# Patient Record
Sex: FEMALE | Race: BLACK OR AFRICAN AMERICAN
[De-identification: names, ages, dates, MRNs, and addresses within clinical notes are randomized per-mention and may not be internally consistent; named-entity substitution may affect disease eponyms.]

---

## 2018-07-11 ENCOUNTER — HOSPITAL ENCOUNTER (OUTPATIENT)
Dept: HOSPITAL 35 - PAIN | Age: 78
Discharge: HOME | End: 2018-07-11
Attending: ANESTHESIOLOGY
Payer: COMMERCIAL

## 2018-07-11 VITALS — HEIGHT: 62.99 IN | WEIGHT: 293 LBS | BODY MASS INDEX: 51.91 KG/M2

## 2018-07-11 VITALS — SYSTOLIC BLOOD PRESSURE: 149 MMHG | DIASTOLIC BLOOD PRESSURE: 103 MMHG

## 2018-07-11 DIAGNOSIS — M47.22: Primary | ICD-10-CM

## 2018-07-11 DIAGNOSIS — M17.0: ICD-10-CM

## 2018-07-11 DIAGNOSIS — Z79.899: ICD-10-CM

## 2018-07-11 DIAGNOSIS — M16.0: ICD-10-CM

## 2018-07-11 DIAGNOSIS — M10.9: ICD-10-CM

## 2018-07-11 DIAGNOSIS — E66.09: ICD-10-CM

## 2018-07-11 DIAGNOSIS — I10: ICD-10-CM

## 2018-07-11 DIAGNOSIS — Z98.890: ICD-10-CM

## 2018-07-11 DIAGNOSIS — Z90.710: ICD-10-CM

## 2018-07-11 DIAGNOSIS — F11.20: ICD-10-CM

## 2018-07-11 DIAGNOSIS — Z87.19: ICD-10-CM

## 2019-11-06 ENCOUNTER — HOSPITAL ENCOUNTER (OUTPATIENT)
Dept: HOSPITAL 35 - PAIN | Age: 79
Discharge: HOME | End: 2019-11-06
Attending: ANESTHESIOLOGY
Payer: COMMERCIAL

## 2019-11-06 VITALS — SYSTOLIC BLOOD PRESSURE: 156 MMHG | DIASTOLIC BLOOD PRESSURE: 99 MMHG

## 2019-11-06 DIAGNOSIS — M54.2: Primary | ICD-10-CM

## 2019-11-06 DIAGNOSIS — M47.22: ICD-10-CM

## 2019-11-06 DIAGNOSIS — Z79.891: ICD-10-CM

## 2019-11-06 DIAGNOSIS — G89.29: ICD-10-CM

## 2019-11-06 DIAGNOSIS — I10: ICD-10-CM

## 2019-11-06 DIAGNOSIS — Z79.899: ICD-10-CM

## 2019-11-06 DIAGNOSIS — Z98.890: ICD-10-CM

## 2019-11-06 DIAGNOSIS — E66.01: ICD-10-CM

## 2019-11-06 NOTE — NUR
Pain Clinic Assessment:
 
1. History of Osteoarthritis:
NECK
KNEES
   History of Rheumatoid Arthritis:
Not Applicable
 
2. Height: 5 ft. 3 in. 160.0 cm.
   Weight:  lb.  oz.  kg.
   Patient's BMI:
 
3. Vital Signs:
   BP: 156/99 Pulse: 92 Resp: 18
   Temp:  02 Sat: 97 ECG Mon:
 
4. Pain Intensity: 7
 
5. Fall Risk:
   Dizziness: N  Needs help standing or walking: Y
   Fallen in the last 3 months: N
   Fall risk comments:
 
 
6. Patient on Blood Thinner: None
 
7. History of Hypertension: Y
 
8. Opioid Therapy greater than 6 weeks: N
   Opiate Contract Signed:
 
9. Risk Assessment Tool Provided: LOW RISK 0/3
 
10. Functional Assessment Tool: 41/70
 
11. Recreational Drug Use: Never Drug Type:
    Tobacco Use: Never Smoker Tobacco Type:
       Amount or Packs/day:  How Many Years:
    Alcohol Use: No  Frequency:  Quant:

## 2019-11-12 NOTE — HPC
[facility]
[address] [place]   [address]                     PAIN MANAGEMENT CONSULTATION  
_______________________________________________________________________________
 
Name:       CHRISTIANA KEYS                 Room #:                     REG [facility]#:      [id number]                       Account #:      [id number]  
Admission:  11/06/19    Attend Phys:    Johnathon Jeffries DO  
Discharge:              Date of Birth:  [birth year]  
                                                          Report #: [id number]
                                                                    [id number]   
_______________________________________________________________________________
THIS REPORT FOR:   //name//                          
 
CC: Sanam Irwin MD
 
DATE OF SERVICE:  11/06/2019
 
 
REFERRING PHYSICIAN:  Sanam Pacheco MD
 
CHIEF COMPLAINT:  Neck pain.
 
HISTORY OF PRESENT ILLNESS:  As you know, the patient is a morbidly obese
78-year-old female who returns today in followup visit to undergo next in the
series of cervical epidural injections under fluoroscopic guidance.  The patient
states she received excellent benefit with previous cervical epidural injection
reporting 95% improvement in overall pain, lasting for nearly 6 months. 
Unfortunately, her symptoms have begun to return without inciting injury or
trauma.  She returns today in followup visit to undergo next in the series of
cervical epidural injections to address cervical radicular symptoms.
 
ALLERGIES:  No reported drug allergies.
 
CURRENT MEDICATIONS:  Valsartan/hydrochlorothiazide, dicyclomine, metoprolol,
allopurinol, oxybutynin, guaifenesin, sertraline, Alphagan, Trusopt,
Bimatoprost, hydrocodone, allopurinol, meloxicam, topiramate and omeprazole.
 
SOCIAL HISTORY:  The patient denies tobacco, alcohol, IV or illicit drug use. 
She is retired since 1996.  Accompanied by a friend who is present in room
today.
 
IMAGING:  No new imaging available.
 
PQRS:  The patient has known arthritic changes of the bilateral knees, bilateral
hips and lumbar spine.  No rheumatoid arthritis, placing pain intensity today
7/10.  She is a fall risk, but has not had a fall in the last 3 months.  She is
utilizing a wheelchair for mobilization.  She is not on blood thinners, but is
treated for hypertension.  She is on chronic opioids, has a low opioid addiction
potential.  Pain impact score is 41/70, indicating moderate-to-severe
interference of daily activities secondary to pain.
 
PHYSICAL EXAMINATION:
VITAL SIGNS:  Blood pressure 156/99, pulse 92, respiratory rate 18 and
unlabored.  The patient is 97% on room air, current pain score is 7/10.
 
 
 
[address]   [address]                     PAIN MANAGEMENT CONSULTATION  
_______________________________________________________________________________
 
Name:       CHRISTIANA KEYS                 Room #:                     REG [facility]#:      [id number]                       Account #:      [id number]  
Admission:  11/06/19    Attend Phys:    Johnathon Jeffries DO  
Discharge:              Date of Birth:  [birth year]  
                                                          Report #: [id number]
                                                                    [id number]   
_______________________________________________________________________________
HEENT:  Normocephalic, atraumatic.  Pupils equal, round, reactive to light. 
Extraocular muscles are intact.  Sclerae nonicteric without injection.
NEUROLOGIC:  Speech fluent.  The patient deemed a fair historian.
EXTREMITIES:  Show no clubbing, no cyanosis, and no edema.
MUSCULOSKELETAL:  Upper extremity strength is symmetrical 5/5.  Deconditioning
noted bilaterally.  She is intact to light touch from C5 through T1 dermatomes. 
Spurling's test is equivocal.  Cervical provocation testing is met with
increasing axial cervical spine pain, no radiation of symptoms.
 
ASSESSMENT:
1.  Symptomatic cervical radiculopathy.
2.  Cervical spondylosis with radiculopathy.
3.  Severe morbid obesity.
4.  Uncontrolled hypertension.
5.  Chronic intractable pain.
 
PLAN:
1.  The patient returns today in followup visit indicating a pain level of 7/10.
 She received excellent benefit with previous cervical epidural injection
reporting 95% improvement in overall pain lasting for 6 months.  Unfortunately,
her symptoms have begun to return.  She denies injury or trauma, may have led to
symptom reoccurrence.  She returns to undergo next in the series of cervical
epidural injections under fluoroscopic guidance.
 
The patient has been advised risks and benefits of a cervical epidural
injection.  These risks include but are not necessarily limited to bleeding,
bruising, infection, worsening of pain, no relief of pain, temporary or
permanent muscle weakness, temporary or permanent nerve damage, possible
paralysis, post-dural puncture headache and death.  The patient states
understood and wished to proceed.
2.  No medication changes made at today's visit.  The patient will continue
current medical therapy as prescribed.
3.  We will see the patient back in followup visit on an as needed basis for
possible next in a series of cervical epidural injections.
 
PROCEDURE NOTE
 
DESCRIPTION OF PROCEDURE:  C7-T1 cervical epidural steroid injection under
fluoroscopic guidance.
 
This is the second procedure of the first series that the patient is undergoing.
 
After obtaining written consent, the patient was taken back to the fluoroscopy
suite and placed in a prone position with separate pillows under chest and
forehead to decrease cervical lordosis.  The skin overlying the cervical area
was prepped and draped in an aseptic fashion.  The C7-T1 vertebral interspace
 
 
 
[address]   [address]                     PAIN MANAGEMENT CONSULTATION  
_______________________________________________________________________________
 
Name:       CHRISTIANA KEYS                 Room #:                     REG [facility]#:      [id number]                       Account #:      [id number]  
Admission:  11/06/19    Attend Phys:    Johnathon Jeffries DO  
Discharge:              Date of Birth:  [birth year]  
                                                          Report #: [id number]
                                                                    [id number]   
_______________________________________________________________________________
was identified by AP fluoroscopy.  The skin and subcutaneous tissue overlying
the target site of injection was anesthetized using 3 mL of 1% lidocaine.
 
A 20-gauge 3-1/2 inch Tuohy needle was advanced under fluoroscopic guidance
toward the epidural space using a midline approach.  The epidural space was
identified using a loss of resistance to air technique.  After negative
aspiration for heme or cerebrospinal fluid, a total of 1 mL of Omnipaque was
injected.  A cervical epidurogram was confirmed using AP and oblique
fluoroscopy.  After negative aspiration for heme or cerebrospinal fluid, 5 mL of
solution containing 2 mL 40 mg per mL, 80 mg total triamcinolone along with 3 mL
lidocaine 1% was injected in increments.  Contrast spread was noted from
posterior epidural space.  The needle was then retracted approximately senior care
and the needle track was flushed with 1 mL of 1% lidocaine.  There were no
apparent new sensory deficits in the upper extremities present following the
procedure.  A sterile bandage was placed over the injection site.
 
The heart rate, pulse oximetry and blood pressure were continuously monitored
after the procedure.  There were no apparent complications.  The patient
tolerated the procedure well and was carefully escorted in the recovery room in
stable condition.  After meeting discharge criteria, the patient was discharged
home.
 
 
 
 
 
 
 
 
 
 
 
 
 
 
 
 
 
 
 
 
 
 
 
  <ELECTRONICALLY SIGNED>
   By: Johnathon Jeffries DO          
  11/12/19     1305
D: 11/08/19 0821                           _____________________________________
T: 11/08/19 1017                           Johnathon Jeffries DO            /nt

## 2020-02-05 ENCOUNTER — HOSPITAL ENCOUNTER (OUTPATIENT)
Dept: HOSPITAL 35 - PAIN | Age: 80
Discharge: HOME | End: 2020-02-05
Attending: ANESTHESIOLOGY
Payer: COMMERCIAL

## 2020-02-05 VITALS — WEIGHT: 287 LBS | HEIGHT: 62.99 IN | BODY MASS INDEX: 50.85 KG/M2

## 2020-02-05 VITALS — DIASTOLIC BLOOD PRESSURE: 89 MMHG | SYSTOLIC BLOOD PRESSURE: 125 MMHG

## 2020-02-05 DIAGNOSIS — E66.01: ICD-10-CM

## 2020-02-05 DIAGNOSIS — M47.22: Primary | ICD-10-CM

## 2020-02-05 DIAGNOSIS — Z98.890: ICD-10-CM

## 2020-02-05 DIAGNOSIS — Z79.899: ICD-10-CM

## 2020-02-05 DIAGNOSIS — M19.90: ICD-10-CM

## 2020-02-05 DIAGNOSIS — G89.29: ICD-10-CM

## 2020-02-05 DIAGNOSIS — I10: ICD-10-CM

## 2020-02-05 NOTE — NUR
Pain Clinic Assessment:
 
1. History of Osteoarthritis:
NECK
KNEES
   History of Rheumatoid Arthritis:
Not Applicable
 
2. Height: 5 ft. 3 in. 160.0 cm.
   Weight: 287.0 lb.  oz. 130.183 kg.
   Patient's BMI: 50.9
 
3. Vital Signs:
   BP: 125/89 Pulse: 88 Resp: 16
   Temp:  02 Sat: 95 ECG Mon:
 
4. Pain Intensity: 9
 
5. Fall Risk:
   Dizziness: N  Needs help standing or walking: Y
   Fallen in the last 3 months: N
   Fall risk comments:
 
 
6. Patient on Blood Thinner: None
 
7. History of Hypertension: Y
 
8. Opioid Therapy greater than 6 weeks: N
   Opiate Contract Signed:
 
9. Risk Assessment Tool Provided: LOW RISK 0/3
 
10. Functional Assessment Tool: 41/70
 
11. Recreational Drug Use: Never Drug Type:
    Tobacco Use: Never Smoker Tobacco Type:
       Amount or Packs/day:  How Many Years:
    Alcohol Use: No  Frequency:  Quant:

## 2020-05-19 ENCOUNTER — HOSPITAL ENCOUNTER (OUTPATIENT)
Dept: HOSPITAL 35 - PAIN | Age: 80
Discharge: HOME | End: 2020-05-19
Attending: ANESTHESIOLOGY
Payer: COMMERCIAL

## 2020-05-19 VITALS — WEIGHT: 293 LBS | BODY MASS INDEX: 51.91 KG/M2 | HEIGHT: 62.99 IN

## 2020-05-19 VITALS — SYSTOLIC BLOOD PRESSURE: 168 MMHG | DIASTOLIC BLOOD PRESSURE: 78 MMHG

## 2020-05-19 DIAGNOSIS — G89.29: ICD-10-CM

## 2020-05-19 DIAGNOSIS — E66.01: ICD-10-CM

## 2020-05-19 DIAGNOSIS — Z79.899: ICD-10-CM

## 2020-05-19 DIAGNOSIS — Z98.890: ICD-10-CM

## 2020-05-19 DIAGNOSIS — M19.90: ICD-10-CM

## 2020-05-19 DIAGNOSIS — I10: ICD-10-CM

## 2020-05-19 DIAGNOSIS — M47.22: Primary | ICD-10-CM

## 2020-05-19 NOTE — NUR
Pain Clinic Assessment:
 
1. History of Osteoarthritis:
NECK
KNEES
   History of Rheumatoid Arthritis:
Not Applicable
 
2. Height: 5 ft. 3 in. 160.0 cm.
   Weight: 302.4 lb.  oz. 137.168 kg.
   Patient's BMI: 53.6
 
3. Vital Signs:
   BP: 168/78 Pulse: 90 Resp: 20
   Temp:  02 Sat: 97 ECG Mon:
 
4. Pain Intensity: 6
 
5. Fall Risk:
   Dizziness: N  Needs help standing or walking: Y
   Fallen in the last 3 months: N
   Fall risk comments:
 
 
6. Patient on Blood Thinner: None
 
7. History of Hypertension: Y
 
8. Opioid Therapy greater than 6 weeks: N
   Opiate Contract Signed:
 
9. Risk Assessment Tool Provided: LOW RISK 0/2
 
10. Functional Assessment Tool: 22/70
 
11. Recreational Drug Use: Never Drug Type:
    Tobacco Use: Never Smoker Tobacco Type:
       Amount or Packs/day:  How Many Years:
    Alcohol Use: No  Frequency:  Quant:

## 2020-05-20 NOTE — HPC
[facility]
[place]   [address]                     PAIN MANAGEMENT CONSULTATION  
_______________________________________________________________________________
 
Name:       CHRISTIANA KEYS                 Room #:                     REG [facility]#:      [id number]                       Account #:      [id number]  
Admission:  05/19/20    Attend Phys:    Johnathon Jeffries DO  
Discharge:              Date of Birth:  [birth year]  
                                                          Report #: [id number]
                                                                    [id number]   
_______________________________________________________________________________
THIS REPORT FOR:  
 
cc:  Rell Olson MD, Steven A. MD Johnson, James E. DO                                          ~
 
DATE OF SERVICE:  05/19/2020
 
 
REFERRING PHYSICIAN:  Sanam Pacheco DO
 
CHIEF COMPLAINT:  Neck pain.
 
HISTORY OF PRESENT ILLNESS:  As you know, the patient is a morbidly obese
79-year-old female who returns today in followup visit with continued neck pain.
 The patient has an unusual stance.  She uses a roller walker and bends at
approximately 90-degree angle at the lumbar spine.  This leads to the necessity
of hyperextending her cervical spine to be able to see forward.  This is leading
to ongoing pain issues.  She returns today in followup visit stating no new
injury or trauma, but recurrence of her chronic cervicalgia and cervical
radicular symptoms.  She returns requesting an epidural injection under
fluoroscopic guidance.  She reports with the previous epidural injection, 75%
improvement for 2-1/2 months.
 
ALLERGIES:  No reported drug allergies.
 
CURRENT MEDICATIONS:  See chart.
 
SOCIAL HISTORY:  The patient denies tobacco, alcohol, IV or illicit drug use. 
She retired in 1996.  She is unaccompanied today.
 
IMAGING:  No new imaging available.
 
PQRS:  The patient has known arthritic changes of the bilateral knees, bilateral
hips, lumbar spine and cervical spine.  No rheumatoid arthritis is reported. 
She is a fall risk, but has not had a fall in last 3 months.  She is utilizing a
roller walker for ambulation.  She is not on blood thinners, but is treated for
hypertension.  She is not on chronic opioids, but does have a low opioid
addiction potential.  Pain impact score is 22/70 indicating mild-to-moderate
interference of daily activities secondary to pain.
 
PHYSICAL EXAMINATION:
VITAL SIGNS:  Blood pressure 168/78, pulse is 90, respiratory rate 20 and
unlabored.  The patient is 97% on room air.  Height 5 feet 3 inches tall, weight
302.4 pounds, BMI calculated at 53.6.
GENERAL:  Well-developed, well-nourished, well-hydrated, severely morbidly obese
 
 
 
[place] [address]                     PAIN MANAGEMENT CONSULTATION  
_______________________________________________________________________________
 
Name:       CHRISTIANA KEYS                 Room #:                     REG [facility] 
[facility]#:      [id number]                       Account #:      [id number]  
Admission:  05/19/20    Attend Phys:    Johnathon Jeffries DO  
Discharge:              Date of Birth:  [birth year]  
                                                          Report #: [id number]
                                                                    [id number]   
_______________________________________________________________________________
79-year-old female appearing stated age.  She is placing current pain score at
6/10.
HEENT:  Normocephalic, atraumatic.  Pupils are equal, round, and reactive.
EXTREMITIES:  Show no clubbing, no cyanosis.  There is 1+ nonpitting lower
extremity edema again noted today.
MUSCULOSKELETAL:  Upper extremity strength is symmetrical 5/5.  Deconditioning
noted bilaterally.  Muscle bulk and tone equal and symmetrical in upper
extremities, intact to light touch from C5-T1 dermatomes.  Spurling's test
remains equivocal.  There is noted pain with all motions of the cervical
provocation including extension, rotation and extension.
 
ASSESSMENT:
1.  Cervical radiculopathy.
2.  Cervical spondylosis with radicular symptoms.
3.  Class 3 severe morbid obesity.
4.  Chronic intractable pain.
 
PLAN:
1.  The patient returns today in followup visit where we have discussed ongoing
neck pain.  The patient reports recurrence of symptoms began about 2 weeks ago
and has progressively worsened.  I do feel that her stance is a contributor to
the patient's ongoing neck pain.  She stands in a forward flexed position of the
lumbar spine with a 90-degree angle.  This then causes hyperextension of the
cervical spine to be able to keep her eyes level on horizon in front of her. 
This is leading to exacerbation of her chronic neck symptoms.  We have discussed
having the patient undergo physical therapy and strengthening of the core
muscles of the back, allowing her to stand more erect.  She has been resistant
to this in the past.  We recommend that she consider this as an option.  She
will consider this discussion today and contact us if she wishes to move forward
with more strengthening options.
2.  The patient has consented to undergo cervical epidural injection under
fluoroscopic guidance to address cervical radicular pain symptoms.  She has been
advised the risks and benefits of a cervical epidural injection.  These risks
include but are not necessarily limited to bleeding, bruising, infection,
worsening pain, no relief of pain, also risk of temporary or permanent muscle
weakness, temporary or permanent nerve damage, possible paralysis and death. 
The patient states understood and wished to proceed.
3.  No medication changes made at today's visit.  The patient will continue
current medical therapy as previously prescribed.
4.  We will see the patient back in followup visit on an as needed basis,
possible next in the series of cervical epidural injections.
 
PROCEDURE NOTE
 
DESCRIPTION OF PROCEDURE:  C7-T1 cervical epidural steroid injection under
fluoroscopic guidance.
 
 
 
[address]   [address]                     PAIN MANAGEMENT CONSULTATION  
_______________________________________________________________________________
 
Name:       CHRISTIANA KEYS                 Room #:                     [facility]#:      [id number]                       Account #:      [id number]  
Admission:  05/19/20    Attend Phys:    Johnathon Jeffries DO  
Discharge:              Date of Birth:  [birth year]  
                                                          Report #: [id number]
                                                                    [id number]   
_______________________________________________________________________________
 
This is the first procedure of the second series that the patient is undergoing.
 
After obtaining written consent, the patient was taken back to the fluoroscopy
suite and placed in a prone position with separate pillows under chest and
forehead to decrease cervical lordosis.  The skin overlying the cervical area
was prepped and draped in an aseptic fashion.  The C7-T1 vertebral interspace
was identified by AP fluoroscopy.  The skin and subcutaneous tissue overlying
the target site of injection was anesthetized using 3 mL of 1% lidocaine.
 
A 20-gauge 3-1/2 inch Tuohy needle was advanced under fluoroscopic guidance
toward the epidural space using a midline approach.  The epidural space was
identified using a loss of resistance to air technique.  After negative
aspiration for heme or cerebrospinal fluid, a total of 1 mL of Omnipaque was
injected.  A cervical epidurogram was confirmed using AP and oblique
fluoroscopy.  After negative aspiration for heme or cerebrospinal fluid, 5 mL of
a solution containing 2 mL 40 mg per mL, 80 mg total triamcinolone along with 3
mL of lidocaine 1% injected in increments.  Contrast spread was noted from
posterior epidural space.  The needle was then retracted approximately care home
and the needle track was flushed with 1 mL of 1% lidocaine.  There were no
apparent new sensory deficits in the upper extremities present following the
procedure.  A sterile bandage was placed over the injection site.
 
The heart rate, pulse oximetry and blood pressure were continuously monitored
after the procedure.  There were no apparent complications.  The patient
tolerated the procedure well and was carefully escorted in the recovery room in
stable condition.  After meeting discharge criteria, the patient was discharged
home.
 
 
 
 
 
 
 
 
 
 
 
 
 
 
 
 
  <ELECTRONICALLY SIGNED>
   By: Johnathon Jeffries DO          
  05/20/20     1541
D: 05/19/20 1354                           _____________________________________
T: 05/19/20 1444                           Johnathon Jeffries DO            /nt

## 2020-07-07 ENCOUNTER — HOSPITAL ENCOUNTER (OUTPATIENT)
Dept: HOSPITAL 35 - PAIN | Age: 80
Discharge: HOME | End: 2020-07-07
Attending: ANESTHESIOLOGY
Payer: COMMERCIAL

## 2020-07-07 VITALS — HEIGHT: 62.99 IN | BODY MASS INDEX: 51.91 KG/M2 | WEIGHT: 293 LBS

## 2020-07-07 VITALS — DIASTOLIC BLOOD PRESSURE: 94 MMHG | SYSTOLIC BLOOD PRESSURE: 142 MMHG

## 2020-07-07 DIAGNOSIS — G89.29: ICD-10-CM

## 2020-07-07 DIAGNOSIS — M47.22: ICD-10-CM

## 2020-07-07 DIAGNOSIS — Z79.899: ICD-10-CM

## 2020-07-07 DIAGNOSIS — M54.2: Primary | ICD-10-CM

## 2020-07-07 DIAGNOSIS — E66.01: ICD-10-CM

## 2020-07-07 NOTE — NUR
Pain Clinic Assessment:
 
1. History of Osteoarthritis:
NECK
KNEES
BACK
   History of Rheumatoid Arthritis:
Not Applicable
 
2. Height: 5 ft. 3 in. 160.0 cm.
   Weight: 302.6 lb.  oz. 137.259 kg.
   Patient's BMI: 53.6
 
3. Vital Signs:
   BP: 142/94 Pulse: 93 Resp: 22
   Temp:  02 Sat: 97 ECG Mon:
 
4. Pain Intensity: 9
 
5. Fall Risk:
   Dizziness: N  Needs help standing or walking: Y
   Fallen in the last 3 months: N
   Fall risk comments:
 
 
6. Patient on Blood Thinner: None
 
7. History of Hypertension: Y
 
8. Opioid Therapy greater than 6 weeks: N
   Opiate Contract Signed:
 
9. Risk Assessment Tool Provided: LOW RISK 0/2
 
10. Functional Assessment Tool: 22/70
 
11. Recreational Drug Use: Never Drug Type:
    Tobacco Use: Never Smoker Tobacco Type:
       Amount or Packs/day:  How Many Years:
    Alcohol Use: No  Frequency:  Quant:

## 2020-07-14 NOTE — HPC
[facility]
[address] [place]   [address]                     PAIN MANAGEMENT CONSULTATION  
_______________________________________________________________________________
 
Name:       CHRISTIANA KEYS                 Room #:                     REG [facility].#:      [id number]                       Account #:      [id number]  
Admission:  07/07/20    Attend Phys:    Johnathon Jeffries DO  
Discharge:              Date of Birth:  [birth year]  
                                                          Report #: [id number]
                                                                    [id number]   
_______________________________________________________________________________
THIS REPORT FOR:  
 
cc:  Rell Olson MD, Steven A. MD Johnson, James E. DO                                          ~
 
DATE OF SERVICE:  07/07/2020
 
 
REFERRING PHYSICIAN:  Rell Olson MD
 
CHIEF COMPLAINT:  Neck pain, bilateral upper extremity pain, bilateral arm pain.
 
HISTORY OF PRESENT ILLNESS:  As you know, the patient is a morbidly obese
79-year-old female who returns today in followup visit with continued neck pain,
bilateral upper back pain, bilateral upper extremity pain with paresthesias. 
She returns requesting next in the series of cervical epidural injections under
fluoroscopic guidance.  As you are aware, the patient has a severely forward
flexed positioning when she uses her roller walker leaving her in a situation
where she has to hyperextend her neck to be able to see forward.  Unfortunately,
due to her body habitus and chronic low back pain issues she is unable to stand
erect.  She returns today in followup visit to address her recurrent cervical
radicular symptoms.  She reports previous epidural injection gave improvement in
symptoms of 75% for almost 2 months.  She returns today for next in the series
of cervical epidural injections.  She denies injury or trauma.
 
ALLERGIES:  No known drug allergies.
 
CURRENT MEDICATIONS:  See chart.
 
SOCIAL HISTORY:  The patient denies tobacco, alcohol, IV or illicit drug use. 
She retired in 1996.  She is accompanied by a family friend present in the room
today.
 
IMAGING:  No new imaging available.
 
PQRS:  The patient has known arthritic changes of bilateral shoulders, bilateral
hips, lumbar spine and cervical spine.  No rheumatoid arthritis.  She is at a
high fall risk, but has not had a fall in the last 3 months.  She utilizes a
roller walker for ambulation and balance.  She is not on blood thinners, but is
treated for hypertension.  She is not on any opioid medications and has a
reported low opioid addiction potential based on assessment tool.  Pain impact
is 22/70, mild-to-moderate interference of daily activities secondary to pain.
 
PHYSICAL EXAMINATION:
VITAL SIGNS:  Blood pressure 142/94, pulse 93, respiratory rate 22 and
 
 
 
[facility]
[address] [place]   [address]                     PAIN MANAGEMENT CONSULTATION  
_______________________________________________________________________________
 
Name:       CHRISTIANA KEYS                 [id number]                       Account #:      [id number]  
Admission:  07/07/20    Attend Phys:    Johnathon Jeffries DO  
Discharge:              Date of Birth:  [birth year]  
                                                          Report #: [id number]
                                                                    [id number]   
_______________________________________________________________________________
unlabored.  The patient is 97% on room air.  Height 5 feet 3 inches tall, weight
302.6 pounds, BMI calculated 53.6.
GENERAL:  Well-developed, well-nourished, well-hydrated, severely morbidly obese
79-year-old female appearing stated age, pain is rated today at around 9/10.
HEENT:  Normocephalic, atraumatic.  Pupils are equal, round, and reactive. 
Speech fluent for the patient.
EXTREMITIES:  Show no clubbing, no cyanosis, nonpitting lower extremity edema
noted bilaterally in lower extremities.
MUSCULOSKELETAL:  Upper extremity strength remains symmetrical 5/5. 
Deconditioning noted bilaterally when comparing left upper extremity to right
upper extremity.  Spurling's test is equivocal.  Pain is elicited with motion in
all planes including rotation, lateral flexion and extension.
 
ASSESSMENT:
1.  Cervical radiculopathy.
2.  Cervical spondylosis with radiculopathy.
3.  Class 3 morbid obesity.
4.  Chronic intractable pain.
5.  Chronic low back pain.
 
PLAN:
1.  The patient returns today in followup visit requesting to undergo cervical
epidural injection under fluoroscopic guidance to address her cervical radicular
symptoms.  She reports excellent benefit with previous cervical epidural
injection 75% improvement in overall pain lasting for nearly 2 months.  She
returns today in followup visit requesting next in the series of epidural
injections to address cervical radiculopathy.  The patient has been advised
risks and benefits of the procedure, states understood and wished to proceed.
2.  No medication changes made at today's visit.  The patient will continue with
current medical therapy as prior prescribed.
3.  We will see the patient back in followup visit on an as needed basis for
possible next in the series of cervical epidural injections.  We are hopeful the
patient will see good and prolonged benefit with today's procedure.
 
PROCEDURE NOTE
 
DESCRIPTION OF PROCEDURE:  C7-T1 cervical epidural steroid injection under
fluoroscopic guidance.
 
This is the second procedure of the second series that the patient is
undergoing.
 
After obtaining written consent, the patient was taken back to the fluoroscopy
suite and placed in a prone position with separate pillows under chest and
forehead to decrease cervical lordosis.  The skin overlying the cervical area
was prepped and draped in an aseptic fashion.  The C7-T1 vertebral interspace
 
 
 
[address]   [address]                     PAIN MANAGEMENT CONSULTATION  
_______________________________________________________________________________
 
Name:       CHRISTIANA KEYS                 Room #:                     REG [facility]#:      [id number]                       Account #:      [id number]  
Admission:  07/07/20    Attend Phys:    Johnathon Jeffries DO  
Discharge:              Date of Birth:  [birth year]  
                                                          Report #: [id number]
                                                                    [id number]   
_______________________________________________________________________________
was identified by AP fluoroscopy.  The skin and subcutaneous tissue overlying
the target site of injection was anesthetized using 3 mL of 1% lidocaine.
 
A 20-gauge 4-1/2 inch Tuohy needle was advanced under fluoroscopic guidance
toward the epidural space using a midline approach.  The epidural space was
identified using a loss of resistance to air technique.  After negative
aspiration for heme or cerebrospinal fluid, a total of 1 mL of Omnipaque was
injected.  A cervical epidurogram was confirmed using AP and oblique
fluoroscopy.  After negative aspiration for heme or cerebrospinal fluid, 5 mL of
a solution containing 2 mL 40 mg per mL, 80 mg total triamcinolone along with 3
mL lidocaine 1% was injected in increments.  Contrast spread was noted from
posterior epidural space.  The needle was then retracted approximately prison
and the needle track was flushed with 1 mL of 1% lidocaine.  There were no
apparent new sensory deficits in the upper extremities present following the
procedure.  A sterile bandage was placed over the injection site.
 
The heart rate, pulse oximetry and blood pressure were continuously monitored
after the procedure.  There were no apparent complications.  The patient
tolerated the procedure well and was carefully escorted in the recovery room in
stable condition.  After meeting discharge criteria, the patient was discharged
home.
 
 
 
 
 
 
 
 
 
 
 
 
 
 
 
 
 
 
 
 
 
 
 
  <ELECTRONICALLY SIGNED>
   By: Johnathon Jeffries DO          
  07/14/20     1407
D: 07/08/20 1218                           _____________________________________
T: 07/08/20 1253                           Johnathon Jeffries DO            /nt

## 2020-08-05 ENCOUNTER — HOSPITAL ENCOUNTER (OUTPATIENT)
Dept: HOSPITAL 35 - PAIN | Age: 80
Discharge: HOME | End: 2020-08-05
Attending: ANESTHESIOLOGY
Payer: COMMERCIAL

## 2020-08-05 VITALS — HEIGHT: 62.99 IN | WEIGHT: 289.8 LBS | BODY MASS INDEX: 51.35 KG/M2

## 2020-08-05 VITALS — DIASTOLIC BLOOD PRESSURE: 95 MMHG | SYSTOLIC BLOOD PRESSURE: 138 MMHG

## 2020-08-05 DIAGNOSIS — G89.29: ICD-10-CM

## 2020-08-05 DIAGNOSIS — M79.18: Primary | ICD-10-CM

## 2020-08-05 DIAGNOSIS — Z98.890: ICD-10-CM

## 2020-08-05 DIAGNOSIS — M19.90: ICD-10-CM

## 2020-08-05 DIAGNOSIS — M47.817: ICD-10-CM

## 2020-08-05 DIAGNOSIS — Z79.899: ICD-10-CM

## 2020-08-05 DIAGNOSIS — I10: ICD-10-CM

## 2020-08-05 DIAGNOSIS — M47.816: ICD-10-CM

## 2020-08-05 NOTE — NUR
Pain Clinic Assessment:
 
1. History of Osteoarthritis:
NECK
KNEES
BACK
   History of Rheumatoid Arthritis:
Not Applicable
 
2. Height: 5 ft. 3 in. 160.0 cm.
   Weight: 289.8 lb.  oz. 131.453 kg.
   Patient's BMI: 51.3
 
3. Vital Signs:
   BP: 138/95 Pulse: 129 Resp: 20
   Temp:  02 Sat: 98 ECG Mon:
 
4. Pain Intensity: 9
 
5. Fall Risk:
   Dizziness: N  Needs help standing or walking: Y
   Fallen in the last 3 months: N
   Fall risk comments:
 
 
6. Patient on Blood Thinner: None
 
7. History of Hypertension: Y
 
8. Opioid Therapy greater than 6 weeks: N
   Opiate Contract Signed:
 
9. Risk Assessment Tool Provided: LOW RISK 0/2
 
10. Functional Assessment Tool: 22/70
 
11. Recreational Drug Use: Never Drug Type:
    Tobacco Use: Never Smoker Tobacco Type:
       Amount or Packs/day:  How Many Years:
    Alcohol Use: No  Frequency:  Quant:

## 2020-08-10 NOTE — HPC
[facility]
[address]
[place]   [address]                     PAIN MANAGEMENT CONSULTATION  
_______________________________________________________________________________
 
Name:       CHRISTIANA KEYS                 Room #:                     REG [facility]#:      [id number]                       Account #:      [id number]  
Admission:  08/05/20    Attend Phys:    Johnathon Jeffries DO  
Discharge:              Date of Birth:  [birth year]  
                                                          Report #: [id number]
                                                                    [id number]   
_______________________________________________________________________________
THIS REPORT FOR:  
 
cc:  Rell Olson MD, Steven A. MD Johnson, James E. DO                                          ~
 
DATE OF SERVICE:  08/05/2020
 
 
CHIEF COMPLAINT:  Right low back pain.
 
HISTORY OF PRESENT ILLNESS:  As you know, the patient is a morbidly obese
79-year-old female who returns today in followup visit with acute onset of right
low back pain.  The patient states she was sitting at home when she began to
experience "a catch in her back."  She states the pain intensified over that day
and has continued.  She denies any specific injury or trauma.  She is able to
localize the pain directly over the lower facet joints at L4-L5 and L5-S1, but
is experiencing mainly muscle spasming in the area.  She discussed her case with
a nurse practitioner from her insurance who came out to evaluate the patient on
a yearly basis and indicated that she also had felt muscle spasming in the area
and recommended treatment.  She returns to discuss those options.
 
ALLERGIES:  No known drug allergies.
 
CURRENT MEDICATIONS:  See chart.
 
SOCIAL HISTORY:  The patient denies tobacco, alcohol, IV or illicit drug use. 
She retired in 1996.  Unaccompanied today.
 
IMAGING:  No imaging available.
 
PQRS:  The patient has known arthritic changes of bilateral knees, bilateral
hips, lumbar spine and cervical spine.  No rheumatoid arthritis.  She is a fall
risk, but has not had a fall in last 3 months.  She utilizes a roller walker for
ambulation.  She is not on blood thinners, but is treated for hypertension.  She
is not on chronic opioids and has a low opiate addiction potential.  Based on
our assessment tool, pain impact is 22/70, mild interference to moderate
interference of daily activities secondary to pain.
 
PHYSICAL EXAMINATION:
VITAL SIGNS:  Blood pressure 138/95, pulse is 129, respiratory rate 20 and
unlabored.  The patient is 98% on room air.  Height 5 feet 3 inches tall, weight
289.8 pounds, BMI calculated 51.3.
GENERAL:  Well-developed, well-nourished, well-hydrated, severely morbidly obese
79-year-old female appearing stated age, pain is rated today 9/10.
HEENT:  Normocephalic, atraumatic.  Pupils are round and reactive.  Speech
 
 
 
[address]   [address]                     PAIN MANAGEMENT CONSULTATION  
_______________________________________________________________________________
 
Name:       CHRISTIANA KEYS                 Room #:                     REG [facility]#:      [id number]                       Account #:      [id number]  
Admission:  08/05/20    Attend Phys:    Johnathon Jeffries DO  
Discharge:              Date of Birth:  [birth year]  
                                                          Report #: [id number]
                                                                    [id number]   
_______________________________________________________________________________
fluent.
EXTREMITIES:  Show no clubbing, no cyanosis, 1+ nonpitting lower extremity edema
noted again today.
MUSCULOSKELETAL:  Palpatory tenderness is noted over the paraspinal musculature
of lower lumbar spine on the right.  Deep palpation over the lower facet joints
is the area of discomfort today.  There are 4 trigger points that are identified
with deep palpation.  There are no changes in skin color, texture over the area
concerning of zoster lesions.  Seated straight leg raising negative.  Supine
straight leg raising negative.  Modified Gaenslen's positive for some axial low
back pain.
 
ASSESSMENT:
1.  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
2.  Facet arthropathy of the lumbar spine.
3.  Myofascial pain.
4.  Chronic intractable pain.
 
PLAN:
1.  The patient returns today in followup visit with acute onset of right low
back symptoms she began to experience while seated talking with friends.  She
states that movement seems to exacerbate symptoms.  She is able to localize the
pain over what appears to be the facet joint of the lower lumbar spine.  She is
complaining of muscle spasming as the main source of symptoms present.  I was
able to elicit 4 trigger points in the area that radiated typical pain she has
been experiencing.  She also has facet arthropathy pain noted with provocation
testing, which may be contributing to symptoms.  We discussed with the patient
the possibility of undergoing trigger point injections today.  She was amenable
to undergo the procedure.
2.  The patient was advised risks and benefits of trigger point injections. 
These risks include but are not necessarily limited to bleeding, bruising,
infection, worsening pain, no relief of pain, also risk of temporary or
permanent muscle weakness, temporary or permanent nerve damage, possible
pneumothorax and death.  The patient states understood and wished to proceed.
3.  No medication changes made at today's visit.  The patient will continue
current medical therapy as previously prescribed.
4.  We will see the patient back in followup visit on an as needed basis for
possible next in a series of trigger point injections.  We are pleased to see
the patient has done well with cervical epidural injection, stating that her
neck pain and upper extremity pain has resolved considerably and she is very
pleased with that response.  We are hopeful the patient will see good
improvement with today's trigger points.
 
PROCEDURE NOTE
 
DESCRIPTION OF PROCEDURE:  Trigger point injections.
 
 
 
 
[address]   [address]                     PAIN MANAGEMENT CONSULTATION  
_______________________________________________________________________________
 
Name:       CHRISTIANA KEYS                 Room #:                     REG [facility]#:      [id number]                       Account #:      [id number]  
Admission:  08/05/20    Attend Phys:    Johnathon Jeffries DO  
Discharge:              Date of Birth:  [birth year]  
                                                          Report #: [id number]
                                                                    [id number]   
_______________________________________________________________________________
After obtaining written consent, the patient was placed in a seated position. 
By palpating using a single finger, 4 trigger points were identified that
reproduced the patient's typical radiating pain pattern.  Trigger points were
located in the paraspinal musculature of the right lower lumbar spine.  Each of
the target sites of injections were cleansed using aseptic technique with
chlorhexidine.  A 25-gauge 1-1/2 inch needle was advanced towards each trigger
point until the patient's typical radiating pain pattern was reproduced.  After
negative aspiration for heme, 1 mL of a solution containing 1 mL 40 mg per mL,
40 mg total triamcinolone and 5 mL of bupivacaine 0.5% was injected at each site
in a fanned out distribution.  The needles were removed and sterile bandages
were placed over each of the injection sites.  The patient was able to move all
4 extremities purposefully after the procedure.
 
The patient tolerated procedure well, carefully escorted to recovery room in
stable condition.  No apparent complications.  VAS before procedure was rated at
9/10, VAS 10 minutes after procedure 2/10.  After meeting our discharge
criteria, the patient discharged home.
 
 
 
 
 
 
 
 
 
 
 
 
 
 
 
 
 
 
 
 
 
 
 
 
 
 
 
  <ELECTRONICALLY SIGNED>
   By: Johnathon Jeffries DO          
  08/10/20     0945
D: 08/05/20 1638                           _____________________________________
T: 08/05/20 2144                           Johnathon Jeffries, DO            /nt

## 2020-09-01 ENCOUNTER — HOSPITAL ENCOUNTER (OUTPATIENT)
Dept: HOSPITAL 35 - PAIN | Age: 80
End: 2020-09-01
Attending: ANESTHESIOLOGY
Payer: COMMERCIAL

## 2020-09-01 VITALS — WEIGHT: 293 LBS | HEIGHT: 62.99 IN | BODY MASS INDEX: 51.91 KG/M2

## 2020-09-01 VITALS — DIASTOLIC BLOOD PRESSURE: 102 MMHG | SYSTOLIC BLOOD PRESSURE: 135 MMHG

## 2020-09-01 DIAGNOSIS — M47.817: ICD-10-CM

## 2020-09-01 DIAGNOSIS — M54.12: Primary | ICD-10-CM

## 2020-09-01 DIAGNOSIS — Z79.899: ICD-10-CM

## 2020-09-01 DIAGNOSIS — G89.29: ICD-10-CM

## 2020-09-01 NOTE — NUR
Pain Clinic Assessment:
 
1. History of Osteoarthritis:
NECK
KNEES
BACK
   History of Rheumatoid Arthritis:
Not Applicable
 
2. Height: 5 ft. 3 in. 160.0 cm.
   Weight: 307.8 lb.  oz. 139.618 kg.
   Patient's BMI: 54.5
 
3. Vital Signs:
   BP: 135/102 Pulse: 105 Resp: 20
   Temp:  02 Sat: 97 ECG Mon:
 
4. Pain Intensity: 9
 
5. Fall Risk:
   Dizziness: N  Needs help standing or walking: Y
   Fallen in the last 3 months: N
   Fall risk comments:
 
 
6. Patient on Blood Thinner: None
 
7. History of Hypertension: Y
 
8. Opioid Therapy greater than 6 weeks: N
   Opiate Contract Signed:
 
9. Risk Assessment Tool Provided: LOW RISK 0/2
 
10. Functional Assessment Tool: 22/70
 
11. Recreational Drug Use: Never Drug Type:
    Tobacco Use: Never Smoker Tobacco Type:
       Amount or Packs/day:  How Many Years:
    Alcohol Use: No  Frequency:  Quant:

## 2020-09-08 NOTE — HPC
[facility]
[address]
[place]   [address]                     PAIN MANAGEMENT CONSULTATION  
_______________________________________________________________________________
 
Name:       CHRISTIANA KEYS                 Room #:                     [facility] 
THUY.#:      [id number]                       Account #:      [id number]  
Admission:  09/01/20    Attend Phys:    Johnathon Jeffries DO  
Discharge:              Date of Birth:  [birth year]  
                                                          Report #: [id number]
                                                                    [id number]   
_______________________________________________________________________________
THIS REPORT FOR:  
 
cc:  Rell Olson MD, Steven A. MD Johnson, James E. DO                                          ~
 
DATE OF SERVICE:  09/01/2020
 
 
CHIEF COMPLAINT:  Right low back and buttock pain.
 
HISTORY OF PRESENT ILLNESS:  As you know, the patient is a class 3 morbidly
obese, severely arthritic 79-year-old female who returns today in followup visit
with continued low back pain and right buttock and posterolateral thigh pain. 
The patient was seen at our clinic 08/05/2020 where she was complaining of just
a point specific back pain.  She requested trigger points to be provided.  They
did not provide much in the way of improvement.  She continues to experience
pain in the low back radiating down the leg, which is more concerning for lumbar
radiculopathy.  The patient and I had discussed at the last visit that the
symptoms that she was experiencing would appear to be more related to an
underlying radicular component, but she felt her symptoms were myofascial in
origin and requested the trigger point injections.  As indicated above, there
was no improvement in symptoms.  She returns today in followup visit stating a
pain of 9/10.  She states that despite taking over-the-counter medication and
prescribed hydrocodone 6 times a day, she has not seen much in the way of
improvement.  She returns today to discuss options for treatment from an
interventional standpoint.
 
ALLERGIES:  No known drug allergies.
 
CURRENT MEDICATIONS:  Cyclobenzaprine, Excedrin, gabapentin, Avalide,
dicyclomine, metoprolol, allopurinol, oxybutynin, guaifenesin, cetirizine,
Alphagan, bimatoprost, hydrocodone, meloxicam, topiramate, and omeprazole.
 
SOCIAL HISTORY:  The patient denies tobacco, alcohol, IV or illicit drug use. 
She retired in 1996.  She is accompanied by a friend present in room today.
 
IMAGING:  No new imaging available.
 
PQRS:  The patient has known arthritic changes of bilateral knees, bilateral
hips, lumbar spine, cervical spine.  No rheumatoid arthritis.  Pain is placed
today at 9/10.  She is a fall risk, but has not had a fall in last 3 months. 
She utilizes a roller walker, canes and intermittently wheelchair to ambulate. 
She is not on blood thinners, but is treated for hypertension.  She is on
chronic opioids and has a low opioid addiction potential based on our assessment
tool.  Pain impact today 22/70.
 
 
 
[address]   [address]                     PAIN MANAGEMENT CONSULTATION  
_______________________________________________________________________________
 
Name:       CHRISTIANA KEYS                 Room #:                     [facility] 
KERA#:      [id number]                       Account #:      [id number]  
Admission:  09/01/20    Attend Phys:    Johnathon Jeffries DO  
Discharge:              Date of Birth:  [birth year]  
                                                          Report #: [id number]
                                                                    [id number]   
_______________________________________________________________________________
 
PHYSICAL EXAMINATION:
VITAL SIGNS:  Blood pressure 135/102, pulse is 105, respiratory rate 20 and
unlabored.  The patient is 97% on room air.  Height 5 feet 3 inches tall, weight
307.8 pounds, BMI calculated 54.5.
GENERAL:  Well-developed, well-nourished, severely morbidly obese, class 3+
morbidly obese 79-year-old female, appears stated age, placing pain today 9/10.
HEENT:  Normocephalic, atraumatic.  Pupils are round, and responsive.  The
patient is wearing a mask in compliance with COVID-19 restrictions.
EXTREMITIES:  Show no clubbing, no cyanosis.  There is lower extremity
nonpitting edema noted bilaterally.
MUSCULOSKELETAL:  The patient has a palpatory tenderness over the paraspinal
musculature of lower lumbar spine.  We were unable to elicit radicular symptoms.
 Seated straight leg raising or supine straight leg raising secondary to the
patient's body habitus.  We are only able to obtain a 45-degree angle in the
seated position and approximately 50 degree angle in the supine straight leg
position.  Gait is antalgic favoring right lower extremity over left.  Stance
appears forward flexed.  Muscle bulk and tone is equal and symmetrical in lower
extremities.
 
ASSESSMENT:
1.  Suspected lumbar radiculopathy.
2.  Lumbosacral spondylosis with radicular symptoms.
3.  Severe facet arthropathy of the lumbar spine.
4.  Chronic cervical radiculopathy.
 
PLAN:
1.  The patient has returned today in followup visit indicating no improvement
with the trigger point injections provided at last visit.  We have advised the
patient at this time that her symptoms appear to be more related to lumbar
radiculopathy, though the patient was insistent that her symptoms were located
just in the lower lumbar region.  We provided the patient trigger point
injections with no benefit.  She returns today with progressively worsening low
back pain, right lower extremity symptoms consistent with lumbar radiculopathy. 
We discussed with the patient that treatment options would be as follows:
 
We discussed physical therapy, stretching exercises, and bariatric intervention
to address the patient's morbid obesity, thus reducing the forces of the weight
upon her lumbar region.  We discussed medication management with suggestions of
altering treatment with increased neuropathic pain medications and a reduction
in her opioid medication as opioids are ineffective for radicular symptoms.  We
discussed epidural injection in the lumbar spine to address her symptoms.  We
also discussed further imaging of the lumbar spine with a CT examination and
referral for surgery, though given her body habitus, she is not an optimal
candidate without significant weight loss.  After reviewing the risks and
benefits of all proposed treatment options, the patient chose at this point to
 
 
 
[address]   [address]                     PAIN MANAGEMENT CONSULTATION  
_______________________________________________________________________________
 
Name:       CHRISTIANA KEYS                 Room #:                     [facility].#:      [id number]                       Account #:      [id number]  
Admission:  09/01/20    Attend Phys:    Johnathon Jeffries DO  
Discharge:              Date of Birth:  [birth year]  
                                                          Report #: [id number]
                                                                    [id number]   
_______________________________________________________________________________
consider a lumbar epidural injection, but not at this visit.
 
2.  We made no changes in the patient's medication management at this time.  We
do recommend that if adjustments are to be made that there is a reduction in
opioid medication as these are ineffective for lumbar radiculopathy and for
cervical radiculopathy and a possible increase in neuropathic pain medication
such as amitriptyline, nortriptyline, Cymbalta, Lyrica or gabapentin, all of
which can be quite beneficial.  We will defer to the primary team who is
providing these medications to make any adjustments that they feel necessary.
3.  We will make ourselves available to the patient if her symptoms continue to
remain and her function continues to decrease, she can return for a lumbar
epidural injection.  She was advised that if she undergoes an injection either
in the lumbar or cervical area, we are precluded from providing future
injections until January as she will have completed 3 injections in the 6-month
period.  She is understanding of the limitations of these injections and will
consider the injection if her symptoms continue and her function decreases
further.  We will see her back in followup visit on an as needed basis.
 
 
 
 
 
 
 
 
 
 
 
 
 
 
 
 
 
 
 
 
 
 
 
 
 
 
 
  <ELECTRONICALLY SIGNED>
   By: Johnathon Jeffries DO          
  09/08/20     1251
D: 09/02/20 1221                           _____________________________________
T: 09/02/20 1504                           Johnathon Jeffries DO            /nt

## 2020-09-15 ENCOUNTER — HOSPITAL ENCOUNTER (OUTPATIENT)
Dept: HOSPITAL 35 - PAIN | Age: 80
Discharge: HOME | End: 2020-09-15
Attending: ANESTHESIOLOGY
Payer: COMMERCIAL

## 2020-09-15 VITALS — DIASTOLIC BLOOD PRESSURE: 110 MMHG | SYSTOLIC BLOOD PRESSURE: 133 MMHG

## 2020-09-15 VITALS — BODY MASS INDEX: 51.91 KG/M2 | HEIGHT: 62.99 IN | WEIGHT: 293 LBS

## 2020-09-15 DIAGNOSIS — M47.26: ICD-10-CM

## 2020-09-15 DIAGNOSIS — M47.27: Primary | ICD-10-CM

## 2020-09-15 DIAGNOSIS — Z79.891: ICD-10-CM

## 2020-09-15 DIAGNOSIS — M19.90: ICD-10-CM

## 2020-09-15 DIAGNOSIS — M54.2: ICD-10-CM

## 2020-09-15 DIAGNOSIS — Z98.890: ICD-10-CM

## 2020-09-15 DIAGNOSIS — I10: ICD-10-CM

## 2020-09-15 DIAGNOSIS — G89.29: ICD-10-CM

## 2020-09-15 DIAGNOSIS — E66.01: ICD-10-CM

## 2020-09-15 DIAGNOSIS — Z79.899: ICD-10-CM

## 2020-09-15 NOTE — NUR
Pain Clinic Assessment:
 
1. History of Osteoarthritis:
NECK
KNEES
BACK
   History of Rheumatoid Arthritis:
Not Applicable
 
2. Height: 5 ft. 3 in. 160.0 cm.
   Weight: 311.0 lb.  oz. 141.069 kg.
   Patient's BMI: 55.1
 
3. Vital Signs:
   BP: 133/110 Pulse: 137 Resp: 20
   Temp:  02 Sat: 100 ECG Mon:
 
4. Pain Intensity: 9
 
5. Fall Risk:
   Dizziness: N  Needs help standing or walking: Y
   Fallen in the last 3 months: N
   Fall risk comments:
 
 
6. Patient on Blood Thinner: None
 
7. History of Hypertension: Y
 
8. Opioid Therapy greater than 6 weeks: N
   Opiate Contract Signed:
 
9. Risk Assessment Tool Provided: LOW RISK 0/2
 
10. Functional Assessment Tool: 22/70
 
11. Recreational Drug Use: Never Drug Type:
    Tobacco Use: Never Smoker Tobacco Type:
       Amount or Packs/day:  How Many Years:
    Alcohol Use: No  Frequency:  Quant:

## 2020-09-16 NOTE — HPC
[address]   [address]                     PAIN MANAGEMENT CONSULTATION  
_______________________________________________________________________________
 
Name:       CHRISTIANA KEYS                 Room #:                     [facility].#:      [id number]                       Account #:      [id number]  
Admission:  09/15/20    Attend Phys:    Johnathon Jeffries DO  
Discharge:              Date of Birth:  [birth year]  
                                                          Report #: [id number]
                                                                    [id number]   
_______________________________________________________________________________
THIS REPORT FOR:  
 
cc:  Rell Olson MD, Steven A. MD Johnson, James E. DO                                          ~
 
DATE OF SERVICE:  09/15/2020
 
 
REFERRING PHYSICIAN:  Rell Olson MD
 
CHIEF COMPLAINT:  Low back pain.
 
HISTORY OF PRESENT ILLNESS:  As you know, the patient is a class 3, morbidly
obese severely arthritic 79-year-old female who returns today in followup visit
with continued low back pain, right lower extremity pain with paresthesias.  The
patient states that she has been "crying for the past 5 days due to pain."  She
has returned today in followup visit to undergo lumbar epidural injection under
fluoroscopic guidance.  The patient, as you are aware, suffers from chronic
cervical radicular symptoms, treated with injection therapy with some benefit. 
She has been treated in the past for lumbar radicular symptoms, but she states
her pain has intensified so greatly that she returns today in followup visit
requesting a lumbar epidural injection.  She indicates no injury or trauma that
may have led to symptom development.  She is placing her current pain score at
around 9/10.
 
ALLERGIES:  No known drug allergies.
 
CURRENT MEDICATIONS:  Cyclobenzaprine, Excedrin, gabapentin, Avalide,
dicyclomine, metoprolol, allopurinol, oxybutynin, guaifenesin, sertraline,
Alphagan, bimatoprost, hydrocodone, meloxicam, topiramate, omeprazole.
 
SOCIAL HISTORY:  The patient denies tobacco, alcohol, IV or illicit drug use. 
She retired in 1996.  She is accompanied by a friend, present in room today.
 
IMAGING:  No new imaging available.
 
PQRS:  The patient has known arthritic changes of the bilateral knees, bilateral
hips, lumbar spine and cervical spine.  No rheumatoid arthritis.  She is placing
pain intensity at 9/10.  She is a fall risk, but has not had a fall in the last
3 months.  She utilizes a roller walker for ambulation.  She is not on blood
thinners, but is treated for hypertension.  She is on chronic opioids and has a
low opioid addiction potential based on our assessment tool.  Pain impact is
22/70 indicating mild-to-moderate interference of daily activities secondary to
pain.
 
 
 
 
[address]   [address]                     PAIN MANAGEMENT CONSULTATION  
_______________________________________________________________________________
 
Name:       CHRISTIANA KEYS                 Room #:                     [facility]#:      [id number]                       Account #:      [id number]  
Admission:  09/15/20    Attend Phys:    Johnathon Jeffries DO  
Discharge:              Date of Birth:  [birth year]  
                                                          Report #: [id number]
                                                                    [id number]   
_______________________________________________________________________________
PHYSICAL EXAMINATION:
VITAL SIGNS:  Blood pressure 133/110, pulse is 137, respiratory rate 20 and
unlabored.  The patient is 100% on room air.  Height 5 feet 3 inches tall,
weight 311 pounds, BMI calculated 55.3.
GENERAL:  Well-developed, well-nourished, well-hydrated, class 3, severely
morbidly obese 79-year-old female appearing stated age.  She is placing current
pain score 9/10.
HEENT:  Normocephalic, atraumatic.  Pupils equal, round and reactive.
NEUROLOGIC:  Speech is fluent.  The patient deemed a good historian.
EXTREMITIES:  Show no clubbing, no cyanosis, but lower extremity nonpitting
edema is noted.
MUSCULOSKELETAL:  Palpatory tenderness is noted over the paraspinal musculature
of lower lumbar spine once again today.  We are unable to elicit any radicular
symptoms with seated straight leg raising.  Supine straight leg raising is
limited significantly by body habitus.  Gait is extremely antalgic favoring
right lower extremity over left.  Stance appears forward flexed.  Muscle bulk
and tone is equal and symmetrical in lower extremities, though deconditioning is
noted.
 
ASSESSMENT:
1.  Lumbar radiculopathy.
2.  Severe facet arthropathy of the lumbar spine.
3.  Lumbosacral spondylosis with radiculopathy.
4.  Chronic neck pain.
 
PLAN:
1.  The patient returns today in followup visit requesting to undergo lumbar
epidural injection under fluoroscopic guidance to address lumbar radiculopathy
involving the low back and right lower extremity.  The patient and I discussed
at length the risks and the benefits of this procedure.  These risks include but
are not necessarily limited to bleeding, bruising, infection, worsening pain, no
relief of pain, also risk of temporary or permanent muscle weakness, temporary
or permanent nerve damage, possible paralysis and death.  The patient states
understood and wished to proceed.
2.  The patient and I did discuss at length treatment options if epidural
injections are ineffective.  Unfortunately, given the patient's age and her
severe morbid obesity, she is not an optimal candidate for surgical options,
though I think ultimately evaluation for this may be necessary.  The fact that
the patient is experiencing decreasing functionality at home and increasing pain
would indicate an underlying lumbar radicular symptom.  At present, she has
unilateral findings which would be consistent with either neural foraminal
stenosis or unilateral disk bulge, though in her history she does describe pain
that involve the left lower extremity and thus the central canal stenosis
interest the differential.  Given her size and her age, central canal stenosis
would be easily present, though confirmation would be necessary with either MRI
or CT examination.  The patient wishes to consider this option, but wishes to
 
 
 
[facility]
[address]
[place]   [address]                     PAIN MANAGEMENT CONSULTATION  
_______________________________________________________________________________
 
Name:       CHRISTIANA KEYS                 Room #:                     [facility].#:      [id number]                       Account #:      [id number]  
Admission:  09/15/20    Attend Phys:    Johnathon Jeffries DO  
Discharge:              Date of Birth:  [birth year]  
                                                          Report #: [id number]
                                                                    [id number]   
_______________________________________________________________________________
undergo epidural injection today.  If this is unsuccessful alleviating symptoms,
then she would look for a more aggressive treatment approach.  The patient was
advised this is the third epidural injection that she can receive in 6 months. 
The next available would be 12/2020.
3.  No medication changes made at today's visit.  The patient will continue
current medical therapy as previously prescribed.
4.  We will see the patient back in followup visit in December for next in the
series of either cervical epidural injections or lumbar epidural injections as
she is limited to 3 epidural injections whether they are done in the cervical
region, lumbar region, or a combination in a 6-month period.
 
PROCEDURE NOTE
DESCRIPTION OF PROCEDURE:  L5-S1 right parasagittal epidural steroid injection
under fluoroscopic guidance.
 
After obtaining written consent, the patient was taken back to fluoroscopy
suite, placed in prone position with pillow under abdomen to decrease lumbar
lordosis.  Skin overlying the lumbosacral area was then prepped and draped in
aseptic fashion.  The L5-S1 vertebral interspace was identified by AP
fluoroscopy.  Skin and subcutaneous tissue overlying the target site of
injection anesthetized with 3 mL of 1% lidocaine.
 
A 20-gauge 6 inches Tuohy needle advanced under fluoroscopic guidance towards
the epidural space using a right parasagittal approach.  Epidural space
identified using loss of resistance to air technique.  After negative aspiration
for heme or cerebrospinal fluid, 1 mL of Omnipaque injected.  A lumbar
epidurogram was confirmed using both AP and lateral fluoroscopy.  After negative
aspiration for heme or cerebrospinal fluid, 5 mL of a solution containing 2 mL
40 mg per mL, 80 mg total triamcinolone along with 3 mL of lidocaine 1% injected
slowly.  Needle then retracted approximately half way, flushed with 1 mL of 1%
lidocaine and then removed.  Sterile bandage placed over injection site.  No new
motor deficits present in the lower extremities following the procedure.
 
The patient tolerated the procedure well, carefully escorted to recovery room in
stable condition.  No apparent complications.  After meeting discharge criteria,
the patient discharged home.
 
 
 
 
 
 
 
 
  <ELECTRONICALLY SIGNED>
   By: Johnathon Jeffries DO          
  09/16/20     1151
D: 09/15/20 1631                           _____________________________________
T: 09/15/20 1900                           Johnathon Jeffries DO            /nt

## 2021-03-03 ENCOUNTER — HOSPITAL ENCOUNTER (OUTPATIENT)
Dept: HOSPITAL 35 - PAIN | Age: 81
Discharge: HOME | End: 2021-03-03
Attending: ANESTHESIOLOGY
Payer: COMMERCIAL

## 2021-03-03 VITALS — DIASTOLIC BLOOD PRESSURE: 97 MMHG | SYSTOLIC BLOOD PRESSURE: 171 MMHG

## 2021-03-03 DIAGNOSIS — Z98.890: ICD-10-CM

## 2021-03-03 DIAGNOSIS — G89.29: ICD-10-CM

## 2021-03-03 DIAGNOSIS — I10: ICD-10-CM

## 2021-03-03 DIAGNOSIS — M19.90: ICD-10-CM

## 2021-03-03 DIAGNOSIS — M47.22: Primary | ICD-10-CM

## 2021-03-03 DIAGNOSIS — Z79.899: ICD-10-CM

## 2021-03-03 NOTE — HPC
[facility]
[address] [place]   [address]                     PAIN MANAGEMENT CONSULTATION  
_______________________________________________________________________________
 
Name:       CHRISTIANA KEYS                 Room #:                     [facility].#:      [id number]                       Account #:      [id number]  
Admission:  03/03/21    Attend Phys:    Johnathon Jeffries DO  
Discharge:              Date of Birth:  [birth year]  
                                                          Report #: [id number]
                                                                    [id number]   
_______________________________________________________________________________
THIS REPORT FOR:  
 
cc:  Rell Olson MD, Steven A. MD Johnson, James E. DO                                          ~
DATE OF SERVICE:  03/03/2021
 
 
REFERRING PHYSICIAN:  Rell Olson MD
 
CHIEF COMPLAINT:  Neck pain, bilateral lower extremity pain with paresthesias.
 
HISTORY OF PRESENT ILLNESS:  As you know, the patient is a pleasant 80-year-old,
class 3, morbidly obese female with longstanding history of cervical
radiculopathy and lumbar radiculopathy.  She returns today in followup visit
stating a pain score of 7/10, reporting mainly pain in the neck and upper
extremities.  She describes numbness and tingling radiating into the hands
bilaterally.  She denies any injury or trauma.  She returns today in followup
visit to undergo cervical epidural injection under fluoroscopic guidance to
address recurrent cervical radiculopathy.  The patient denies injury or trauma
or any changes in medical history since her last visit.  She has not undergone
and now planning to undergo COVID-19 injections at this juncture.
 
ALLERGIES:  No known drug allergies.
 
CURRENT MEDICATIONS:  Cyclobenzaprine, Excedrin, gabapentin, Avalide,
dicyclomine, metoprolol, allopurinol, oxybutynin, guaifenesin, sertraline,
Alphagan, bimatoprost, hydrocodone, meloxicam, topiramate, omeprazole.
 
SOCIAL HISTORY:  The patient denies tobacco, alcohol, IV or illicit drug use. 
She is retired, retiring in 1996.  Unaccompanied today.
 
IMAGING:  No new imaging available.
 
PQRS:  The patient has known arthritic changes of the cervical spine, lumbar
spine, bilateral knees, bilateral hips.  No rheumatoid arthritis.  She is
placing pain intensity today at 7/10.  She is a fall risk, but has not had a
fall in last 3 months.  She utilizes a wheelchair while at the hospital, a
roller walker at home.  She is not on blood thinners, but is treated for
hypertension.  She is not on chronic opioids, has a low opiate addiction
potential based on our assessment tool.  Pain impact is 22/70, mild-to-moderate
interference of daily activities secondary to pain.
 
PHYSICAL EXAMINATION:
VITAL SIGNS:  Blood pressure 171/97, pulse is 98, respiratory rate 20 and mildly
labored, O2 sat 96%.  Current pain is 7/10.
 
 
 
[place] [address]                     PAIN MANAGEMENT CONSULTATION  
_______________________________________________________________________________
 
Name:       CHRISTIANA KEYS                 Room #:                     [facility]#:      [id number]                       Account #:      [id number]  
Admission:  03/03/21    Attend Phys:    Johnathon Jeffries DO  
Discharge:              Date of Birth:  [birth year]  
                                                          Report #: [id number]
                                                                    [id number]   
_______________________________________________________________________________
HEENT:  Normocephalic, atraumatic.  Pupils equal, round, and responsive. 
Extraocular muscles are intact.  The patient is wearing a mask in compliance
with COVID-19 regulations.
EXTREMITIES:  Show no clubbing, no cyanosis.  There is 2+ nonpitting lower
extremity edema noted.
MUSCULOSKELETAL:  Palpatory tenderness remains over the paraspinal musculature
of cervical spine.  No spinous process tenderness.  Cervical provocation testing
is met with increasing pain.  There is crepitus noted with movement in rotation,
and lateral flexion.  Spurling's test is positive.  Upper extremity strength is
symmetrical, but deconditioned.  Muscle strength 5/5.
 
ASSESSMENT:
1.  Cervical radiculopathy.
2.  Cervical facet arthropathy.
3.  Chronic intractable pain.
 
PLAN:
1.  The patient returns today in followup visit to address cervical radicular
symptoms that have reoccurred.  She reports good efficacy with previous cervical
epidural injection giving up to 80% improvement in overall pain.  Unfortunately,
her symptoms have begun to return.  No inciting injury or trauma.  She has been
advised the risks and benefits of the next in the series of cervical epidural
injections.  These risks include but are not necessarily limited to bleeding,
bruising, infection, worsening pain, no relief of pain, also risk of temporary
or permanent muscle weakness, temporary or permanent nerve damage, possible
paralysis and death.  The patient states understood and wished to proceed.
2.  No medication changes made at today's visit.  The patient will continue
current medical therapy as prior prescribed.
3.  We will see the patient back in followup visit on an as needed basis to
address either recurrent cervical radicular symptoms or to discuss epidural
injections for lumbar radiculopathy.
 
 
PROCEDURE NOTE
 
PROCEDURE:  Cervical epidural steroid injection under fluoroscopic guidance.
 
DESCRIPTION OF PROCEDURE:  After obtaining written consent, the patient was
taken back to fluoroscopy suite, placed in a prone position with separate
pillows under chest and forehead to decrease cervical lordosis.  Skin overlying
cervical area then prepped and draped in aseptic fashion.  The cervical
interspaces were identified by AP fluoroscopy.  Skin and subcutaneous tissue
overlying target site injection anesthetized with 3 mL of 1% lidocaine.
 
A 20-gauge 4-1/2 inch Tuohy needle advanced under fluoroscopic guidance towards
the epidural space using midline approach.  Epidural space identified using loss
 
 
 
[address]   [address]                     PAIN MANAGEMENT CONSULTATION  
_______________________________________________________________________________
 
Name:       CHRISTIANA KEYS                 Room #:                     [facility]#:      [id number]                       Account #:      [id number]  
Admission:  03/03/21    Attend Phys:    Johnathon Jeffries DO  
Discharge:              Date of Birth:  [birth year]  
                                                          Report #: [id number]
                                                                    [id number]   
_______________________________________________________________________________
of resistance to air technique.  After negative aspiration for heme or
cerebrospinal fluid, 1 mL of Omnipaque injected.  A cervical epidurogram
confirmed using both AP and oblique fluoroscopy.  After negative aspiration for
heme or cerebrospinal fluid, 5 mL of a solution containing 2 mL 40 mg per mL, 80
mg total triamcinolone along with 3 mL of lidocaine 1% injected slowly.  Needle
retracted detention, flushed with 1 mL of 1% lidocaine and removed.  Sterile
bandage placed over injection site.  No new motor deficits present in the upper
extremities following procedure.
 
The patient tolerated procedure well, carefully escorted to recovery room in
stable condition.  No apparent complications.  After meeting discharge criteria,
the patient discharged home.
 
 
 
 
 
 
 
 
 
 
 
 
 
 
 
 
 
 
 
 
 
 
 
 
 
 
 
 
 
 
 
 
  <ELECTRONICALLY SIGNED>
   By: Johnathon Jeffries DO          
  03/03/21     1453
D: 03/03/21 1330                           _____________________________________
T: 03/03/21 1415                           Johnathon Jeffries DO            /nt

## 2021-03-03 NOTE — NUR
Pain Clinic Assessment:
 
1. History of Osteoarthritis:
NECK
KNEES
BACK
   History of Rheumatoid Arthritis:
Not Applicable
 
2. Height:  ft.  in.  cm.
   Weight:  lb.  oz.  kg.
   Patient's BMI:
 
3. Vital Signs:
   BP: 171/97 Pulse: 98 Resp: 20
   Temp:  02 Sat: 96 ECG Mon:
 
4. Pain Intensity: 7
 
5. Fall Risk:
   Dizziness: N  Needs help standing or walking: Y
   Fallen in the last 3 months: N
   Fall risk comments:
 
 
6. Patient on Blood Thinner: None
 
7. History of Hypertension: Y
 
8. Opioid Therapy greater than 6 weeks: N
   Opiate Contract Signed:
 
9. Risk Assessment Tool Provided: LOW RISK 0/2
 
10. Functional Assessment Tool: 22/70
 
11. Recreational Drug Use: Never Drug Type:
    Tobacco Use: Never Smoker Tobacco Type:
       Amount or Packs/day:  How Many Years:
    Alcohol Use: No  Frequency:  Quant:

## 2021-03-24 ENCOUNTER — HOSPITAL ENCOUNTER (OUTPATIENT)
Dept: HOSPITAL 35 - PAIN | Age: 81
Discharge: HOME | End: 2021-03-24
Attending: ANESTHESIOLOGY
Payer: COMMERCIAL

## 2021-03-24 VITALS — SYSTOLIC BLOOD PRESSURE: 148 MMHG | DIASTOLIC BLOOD PRESSURE: 102 MMHG

## 2021-03-24 VITALS — WEIGHT: 293 LBS | BODY MASS INDEX: 51.91 KG/M2 | HEIGHT: 62.99 IN

## 2021-03-24 DIAGNOSIS — I10: ICD-10-CM

## 2021-03-24 DIAGNOSIS — Z79.899: ICD-10-CM

## 2021-03-24 DIAGNOSIS — G89.29: ICD-10-CM

## 2021-03-24 DIAGNOSIS — Z98.890: ICD-10-CM

## 2021-03-24 DIAGNOSIS — M19.90: ICD-10-CM

## 2021-03-24 DIAGNOSIS — M47.22: Primary | ICD-10-CM

## 2021-03-24 NOTE — NUR
Pain Clinic Assessment:
 
1. History of Osteoarthritis:
NECK
KNEES
BACK
   History of Rheumatoid Arthritis:
Not Applicable
 
2. Height: 5 ft. 3 in. 160.0 cm.
   Weight: 308.0 lb.  oz. 139.708 kg.
   Patient's BMI: 54.6
 
3. Vital Signs:
   BP: 148/102 Pulse: 94 Resp: 18
   Temp:  02 Sat: 97 ECG Mon:
 
4. Pain Intensity: 8
 
5. Fall Risk:
   Dizziness: N  Needs help standing or walking: Y
   Fallen in the last 3 months: N
   Fall risk comments:
 
 
6. Patient on Blood Thinner: None
 
7. History of Hypertension: Y
 
8. Opioid Therapy greater than 6 weeks: N
   Opiate Contract Signed:
 
9. Risk Assessment Tool Provided: LOW RISK 0/2
 
10. Functional Assessment Tool: 22/70
 
11. Recreational Drug Use: Never Drug Type:
    Tobacco Use: Never Smoker Tobacco Type:
       Amount or Packs/day:  How Many Years:
    Alcohol Use: No  Frequency:  Quant:

## 2021-03-26 NOTE — HPC
[address]   [address]                     PAIN MANAGEMENT CONSULTATION  
_______________________________________________________________________________
 
Name:       CHRISTIANA KEYS                 Room #:                     [facility] 
[facility].#:      [id number]                       Account #:      [id number]  
Admission:  03/24/21    Attend Phys:    Johnathon Jeffries DO  
Discharge:              Date of Birth:  [birth year]  
                                                          Report #: [id number]
                                                                    [id number]   
_______________________________________________________________________________
THIS REPORT FOR:  
 
cc:  Rell Olson MD, Steven A. MD Johnson, James E. DO                                          ~
DATE OF SERVICE:  03/24/2021
 
 
REFERRING PHYSICIAN:  Rell Olson MD
 
CHIEF COMPLAINT:  Neck pain, bilateral upper extremity pain with paresthesias.
 
HISTORY OF PRESENT ILLNESS:  As you know, the patient is a 80-year-old female
with longstanding history of chronic cervical radiculopathy, chronic lumbar
radiculopathy.  She returns today in followup visit requesting to undergo a
cervical epidural injection under fluoroscopic guidance.  The patient is placing
her current pain score at 8/10.  She states she has suffered no injury or trauma
that may have led to symptom development.  She has been at home, crying over the
past couple of days due to increasing pain issues.  She has not sought
evaluation from a neurosurgical standpoint and has been seeing improvement in
symptoms with cervical injections in the past and is hopeful to undergo next in
the series today to build on success of previous intervention.  She indicates
pain improvement with the previous injection of 70%.  She returns today in
followup visit to undergo the next in the series of cervical epidural injections
in hopes of further improvement in analgesia.
 
ALLERGIES:  No known drug allergies.
 
CURRENT MEDICATIONS:  See chart.
 
SOCIAL HISTORY:  The patient denies tobacco, alcohol, IV or illicit drug use. 
She is retired, retired in 1986.  Unaccompanied today.
 
IMAGING:  No new imaging available.
 
PQRS:  The patient has known arthritic changes of the cervical spine, lumbar
spine, bilateral knees and bilateral hips.  No rheumatoid arthritis is
diagnosed.  She is placing current pain score at 8/10.  She is at fall risk, but
has not had a fall in last 3 months.  She utilizes ambulatory devices such as a
roller walkers and wheelchair.  She is treated for hypertension, but takes no
blood thinners.  She is not on any chronic opioids, has a low opioid addiction
potential based on assessment tool.  Pain impact is 22/70.  Mild-to-moderate
interference of daily activities secondary to pain.
 
PHYSICAL EXAMINATION:
VITAL SIGNS:  Blood pressure 148/102, pulse is 94, respiratory rate 18 and
 
 
 
[address]   [address]                     PAIN MANAGEMENT CONSULTATION  
_______________________________________________________________________________
 
Name:       CHRISTIANA KESY                 Room #:                     REG [facility]#:      [id number]                       Account #:      [id number]  
Admission:  03/24/21    Attend Phys:    Johnathon Jeffries DO  
Discharge:              Date of Birth:  [birth year]  
                                                          Report #: [id number]
                                                                    [id number]   
_______________________________________________________________________________
unlabored.  The patient is 97% on room air.  Height 5 feet 3 inches tall, weight
308 pounds, BMI calculated 54.6.
GENERAL:  Well-developed, well-nourished, well-hydrated, severely morbidly obese
80-year-old female appearing her stated age, pain is rated today at 8/10.
HEENT:  Normocephalic and atraumatic.  Pupils are responsive.  The patient's
extraocular muscles are intact.  She is wearing a mask in compliance with
COVID-19 regulations.
EXTREMITIES:  Show no clubbing, no cyanosis.  There is once again 2+ nonpitting
lower extremity edema bilaterally.
MUSCULOSKELETAL:  Upper extremity strength is symmetrical, but deconditioned. 
Muscle bulk and tone is symmetrical in comparing upper extremities.  Spurling's
test is positive.  She remains intact to light touch from C5 through T1
dermatomes.  Deep tendon reflexes are symmetrical, but diminished bilaterally.
 
ASSESSMENT:
1.  Cervical radiculopathy.
2.  Cervical facet arthropathy.
3.  Chronic intractable pain.
 
PLAN:
1.  The patient returns today in followup visit requesting to undergo cervical
epidural injection under fluoroscopic guidance.  The most recent epidural
injection gave 70% improvement in overall pain for which she reports she
continues to experience response.  She returns today in followup visit to
undergo next in the series of cervical epidural injections to address her
residual 8/10 pain.  The patient has been advised risks and benefits of the
procedure, states understood and wished to proceed.
2.  No medication changes made at today's visit.  The patient will continue
current medical therapy as prior prescribed.
3.  We plan to see the patient back in followup visit on an as needed basis for
the next in the series of cervical epidural injections.  I did advise the
patient that this is the second in the series of epidural injections in the 6
months.  We have 1 remaining cervical epidural injection until 09/03/2021.
 
DESCRIPTION OF PROCEDURE:  C7-T1 cervical epidural steroid injection under
fluoroscopic guidance.
 
After obtaining written consent, the patient was taken back to fluoroscopy
suite, placed in prone position with pillow under chest and forehead to decrease
cervical lordosis.  Skin overlying cervical area then prepped and draped in
aseptic fashion.  C7-T1 cervical interspace was identified by AP fluoroscopy. 
Skin and subcutaneous tissue overlying target site injection anesthetized with 3
mL of 1% lidocaine.
 
A 20-gauge 4-1/2 inch Tuohy needle advanced under fluoroscopic guidance towards
the epidural space using a midline approach.  Epidural space identified using
 
 
 
[address]   [address]                     PAIN MANAGEMENT CONSULTATION  
_______________________________________________________________________________
 
Name:       CHRISTIANA KEYS                 Room #:                     [facility]#:      [id number]                       Account #:      [id number]  
Admission:  03/24/21    Attend Phys:    Johnathon Jeffries DO  
Discharge:              Date of Birth:  [birth year]  
                                                          Report #: [id number]
                                                                    [id number]   
_______________________________________________________________________________
loss of resistance to air technique.  After negative aspiration for heme or
cerebrospinal fluid, 1 mL of Omnipaque injected.  A cervical epidurogram was
confirmed using both AP and lateral fluoroscopy.  After negative aspiration for
heme or cerebrospinal fluid, 5 mL of a solution containing 2 mL 40 mg per mL, 80
mg total triamcinolone along with 3 mL of lidocaine 1% injected slowly.  Needle
retracted jail, flushed with 1 mL of 1% lidocaine and removed.  Sterile
bandage placed over injection site.  No new motor deficits present in the upper
extremities following procedure.
 
The patient tolerated procedure well, carefully escorted to recovery room in
stable condition.  No apparent complications.  After meeting discharge criteria,
the patient discharged home.
 
 
 
 
 
 
 
 
 
 
 
 
 
 
 
 
 
 
 
 
 
 
 
 
 
 
 
 
 
 
 
 
  <ELECTRONICALLY SIGNED>
   By: Johnathon Jeffries DO          
  03/26/21     1452
D: 03/24/21 1625                           _____________________________________
T: 03/24/21 1817                           Johnathon Jeffries DO            /nt

## 2021-04-13 ENCOUNTER — HOSPITAL ENCOUNTER (OUTPATIENT)
Dept: HOSPITAL 35 - PAIN | Age: 81
Discharge: HOME | End: 2021-04-13
Attending: ANESTHESIOLOGY
Payer: COMMERCIAL

## 2021-04-13 VITALS — WEIGHT: 293 LBS | BODY MASS INDEX: 51.91 KG/M2 | HEIGHT: 62.99 IN

## 2021-04-13 VITALS — DIASTOLIC BLOOD PRESSURE: 106 MMHG | SYSTOLIC BLOOD PRESSURE: 177 MMHG

## 2021-04-13 DIAGNOSIS — E66.01: ICD-10-CM

## 2021-04-13 DIAGNOSIS — G89.29: ICD-10-CM

## 2021-04-13 DIAGNOSIS — Z98.890: ICD-10-CM

## 2021-04-13 DIAGNOSIS — I10: ICD-10-CM

## 2021-04-13 DIAGNOSIS — M47.26: Primary | ICD-10-CM

## 2021-04-13 DIAGNOSIS — Z79.899: ICD-10-CM

## 2021-04-13 DIAGNOSIS — M47.27: ICD-10-CM

## 2021-04-13 DIAGNOSIS — M19.90: ICD-10-CM

## 2021-04-13 NOTE — NUR
Pain Clinic Assessment:
 
1. History of Osteoarthritis:
NECK
KNEES
BACK
   History of Rheumatoid Arthritis:
Not Applicable
 
2. Height: 5 ft. 3 in. 160.0 cm.
   Weight: 308.0 lb.  oz. 139.708 kg.
   Patient's BMI: 54.6
 
3. Vital Signs:
   BP: 177/106 Pulse: 106 Resp: 20
   Temp:  02 Sat: 100 ECG Mon:
 
4. Pain Intensity: 9
 
5. Fall Risk:
   Dizziness: N  Needs help standing or walking: Y
   Fallen in the last 3 months: N
   Fall risk comments:
 
 
6. Patient on Blood Thinner: None
 
7. History of Hypertension: Y
 
8. Opioid Therapy greater than 6 weeks: N
   Opiate Contract Signed:
 
9. Risk Assessment Tool Provided: LOW RISK 0
 
10. Functional Assessment Tool: 22/70
 
11. Recreational Drug Use: Never Drug Type:
    Tobacco Use: Never Smoker Tobacco Type:
       Amount or Packs/day:  How Many Years:
    Alcohol Use: No  Frequency:  Quant:

## 2021-04-14 NOTE — HPC
[facility]
[address] [place]   [address]                     PAIN MANAGEMENT CONSULTATION  
_______________________________________________________________________________
 
Name:       CHRISTIANA KEYS                 Room #:                     REG MANUEL 
Della.#:      [id number]                       Account #:      [id number]  
Admission:  04/13/21    Attend Phys:    Johnathon Jeffries DO  
Discharge:              Date of Birth:  [birth year]  
                                                          Report #: [id number]
                                                                    [id number]   
_______________________________________________________________________________
THIS REPORT FOR:  
 
cc:  Rell Olson MD, Steven A. MD Johnson, James E. DO                                          ~
DATE OF SERVICE:  04/13/2021
 
 
CHIEF COMPLAINT:  Low back pain, bilateral buttock and posterolateral thigh
pain.
 
HISTORY OF PRESENT ILLNESS:  As you know, the patient is a 80-year-old female
with longstanding history of chronic cervical radiculopathy and chronic lumbar
radiculopathy.  Recently, we have been treating the patient's cervical spine due
to progressively worsening central canal stenosis.  She states her pain in that
area has improved to some degree.  She reports that the cervical epidural
injection provided at our last visit gave 75% improvement in symptoms. 
Unfortunately, the patient's low back and area has begun to become more
problematic.  She states she can barely get out of bed to even move to the
bathroom.  She is placing pain score at 9/10.  She returns today in followup
visit requesting treatment for lumbar radiculopathy.  The patient denies any
injury or trauma that may have led to symptom reoccurrence.  She has had changes
in her medication management with addition of tramadol for which the patient
states she has received no benefit.  She is continuing to take 60 morphine
equivalence of hydrocodone per day and despite this elevated dosing of
medications, noting no improvement.  She returns today in followup visit to
discuss treatment options for lumbar radiculopathy.
 
ALLERGIES:  No known drug allergies.
 
CURRENT MEDICATIONS:  Tramadol 50 mg 3 times a day, cyclobenzaprine 10 mg 3
times a day, Excedrin caplet 1 tab p.r.n., gabapentin 100 mg 3 times a day,
irbesartan/hydrochlorothiazide 300/12.5 mg once a day, dicyclomine 20 mg once a
day, metoprolol 25 mg once a day, allopurinol 100 mg once a day, oxybutynin 5 mg
once a day, Mucinex 600 mg every 12 hours, cetirizine 10 mg per day, Alphagan 1
drop each eye per day, Trusopt 1 drop each eye per day, bimatoprost 1 drop each
eye per day, hydrocodone/acetaminophen 10/325 one tab every 4 hours p.r.n. pain,
meloxicam 15 mg once a day, topiramate 50 mg b.i.d., omeprazole 40 mg b.i.d.
 
SOCIAL HISTORY:  The patient denies tobacco, alcohol or IV illicit drug use. 
She is retired, retired in 1986.  Unaccompanied today.
 
IMAGING:  No new imaging available.
 
PQRS:  The patient has known arthritic changes of the cervical spine, lumbar
spine, bilateral knees, bilateral hips.  No rheumatoid arthritis.  She is
 
 
 
[place] [address]                     PAIN MANAGEMENT CONSULTATION  
_______________________________________________________________________________
 
Name:       CHRISTIANA KEYS                 Room #:                     FARTUN COTE#:      [id number]                       Account #:      [id number]  
Admission:  04/13/21    Attend Phys:    Johnathon Jeffries DO  
Discharge:              Date of Birth:  [birth year]  
                                                          Report #: [id number]
                                                                    [id number]   
_______________________________________________________________________________
placing current pain score 9/10.  She is a fall risk, but has not had a fall in
last 3 months.  She is not on blood thinners, but is treated for hypertension. 
She is on chronic opioids with a low risk of opioid addiction.  Pain impact
22/70, moderate interference of daily activities secondary to pain.
 
PHYSICAL EXAMINATION:
VITAL SIGNS:  Blood pressure 177/106, pulse 106, respiratory rate 20 and
unlabored.  The patient is 100% on room air.  Height 5 feet 3 inches tall,
weight 308 pounds, BMI calculated 54.6.
GENERAL:  Well-developed, well-nourished, well-hydrated, severely morbidly
obese, placing current pain score 9/10.
HEENT:  Normocephalic, atraumatic.  Pupils are round.  The patient is wearing a
mask in compliance with COVID-19 regulations.
EXTREMITIES:  Show no clubbing, no cyanosis.  There is 1+ nonpitting lower
extremity edema.
MUSCULOSKELETAL:  Palpatory tenderness is noted over the paraspinal musculature
of lower lumbar spine.  No spinous process tenderness.  Gait is extremely
antalgic, stance is forward flexed with loss of lordotic curvature.  Seated
straight leg raising is negative.  Supine straight leg raising is unable to be
performed due to body habitus.  Lumbar provocation testing met with increasing
pain with all maneuvers.
 
ASSESSMENT:
1.  Chronic lumbar radiculopathy.
2.  Lumbosacral spondylosis with radiculopathy.
3.  Facet arthropathy of the lumbar spine.
4.  Class 3 morbid obesity.
5.  Chronic intractable pain.
 
PLAN:
1.  Based on today's physical exam and the history the patient has provided, the
description the patient uses in regards to pain as well as location of symptoms,
it would appear the patient is suffering from not only a lumbar radiculopathy,
but also progressively worsening facet arthropathy of the lumbar spine.  The
patient's weight is a significant contributor to the patient's low back symptoms
and pain.  This is obvious with the findings on physical exam today as well as
the factors that exacerbate her pain.  It would appear the patient is suffering
from 2 different pain generators, one is the facet arthropathy of the lumbar
spine exacerbated by her excessive weight, the other is a lumbar radiculopathy. 
We have discussed with the patient the treatment options we have available. 
Following was discussed with the patient today.
 
We discussed physical therapy, stretching exercises, and stretching techniques
along with a concerted effort at weight loss.  We discussed medication
suggestions to help manage her symptoms.  She is on a high dose of opioid
medication, taking 60 morphine equivalence of hydrocodone per day along with the
 
 
 
[facility]
[address] [place]   [address]                     PAIN MANAGEMENT CONSULTATION  
_______________________________________________________________________________
 
Name:       CHRISTIANA KEYS KINJAL                 [id number]                       Account #:      [id number]  
Admission:  04/13/21    Attend Phys:    Johnathon Jeffries DO  
Discharge:              Date of Birth:  [birth year]  
                                                          Report #: [id number]
                                                                    [id number]   
_______________________________________________________________________________
new addition of tramadol for which she is taking up to 6 tablets a day.  Even
with this combination of medication, which places at near if not higher than the
CDC's recommended no greater than 90 morphine equivalents a day.  She is still
not experiencing much in the way of analgesic benefit.  Further adjustments
could be addressed nonopioid related.  We discussed lumbar epidural injection
under fluoroscopic guidance as a way to treat her symptoms and finally surgical
options, which may be ultimately necessary.  After reviewing risks and benefits
of all the proposed treatment options, the patient chose to move forward with a
lumbar epidural injection under fluoroscopic guidance.
2.  The patient was advised risks and benefits of a lumbar epidural injection. 
These risks include but are not necessarily limited to bleeding, bruising,
infection, worsening pain, no relief of pain, also risk of temporary or
permanent muscle weakness, temporary or permanent nerve damage, possible
paralysis and death.  The patient states understood and wished to proceed.
3.  No medication changes made at today's visit.  The patient will continue
current medical therapy as prior prescribed.
4.  We plan to see the patient back in followup visit on an as needed basis.  We
have completed 3 shots in a 6-month period, which we will have to carry through
09/03/2021, will be the next available injection, whether it will be cervical or
lumbar.  She has completed all 3 of the epidural injections in the last month
and a half.  We have advised the patient of this today and she chose to go ahead
and undergo the lumbar epidural injection, even though she will have to wait
until September to have the next in the series of injections whether addressing
cervical radiculopathy or lumbar radiculopathy.
 
PROCEDURE NOTE
 
DESCRIPTION OF PROCEDURE:  L5-S1 interlaminar epidural steroid injection under
fluoroscopic guidance.
 
After obtaining written consent, the patient was taken back to fluoroscopy
suite, placed in prone position with pillow under abdomen to decrease lumbar
lordosis.  Skin overlying lumbosacral area prepped and draped in aseptic
fashion.  The L5-S1 vertebral interspace identified by AP fluoroscopy.  Skin and
subcutaneous tissue overlying target site injection anesthetized with 3 mL of 1%
lidocaine.
 
A 20-gauge 4-1/2 inch Tuohy needle advanced under fluoroscopic guidance towards
the epidural space using a midline approach.  Epidural space identified using
loss of resistance to air technique.  After negative aspiration for heme or
cerebrospinal fluid, 1 mL of Omnipaque injected.  A lumbar epidurogram was
confirmed using both AP and lateral fluoroscopy.  After negative aspiration for
heme or cerebrospinal fluid, 5 mL of a solution containing 2 mL 40 mg per mL, 80
mg total triamcinolone along with 3 mL of lidocaine 1% injected slowly.  Needle
retracted [facility], flushed with 1 mL of 1% lidocaine and removed.  Sterile
bandage placed over injection site.  No new motor deficits present in the lower
 
 
 
[facility]
[address] [place]   [address]                     PAIN MANAGEMENT CONSULTATION  
_______________________________________________________________________________
 
Name:       CHRISTIANA KEYS                 Room #:                     REG [facility]#:      [id number]                       Account #:      [id number]  
Admission:  04/13/21    Attend Phys:    Johnathon Jeffries DO  
Discharge:              Date of Birth:  [birth year]  
                                                          Report #: [id number]
                                                                    [id number]   
_______________________________________________________________________________
extremities following procedure.
 
The patient tolerated procedure well, carefully escorted to the recovery room in
stable condition.  No apparent complications.  After meeting discharge criteria,
the patient discharged home.
 
 
 
 
 
 
 
 
 
 
 
 
 
 
 
 
 
 
 
 
 
 
 
 
 
 
 
 
 
 
 
 
 
 
 
 
 
 
 
  <ELECTRONICALLY SIGNED>
   By: Johnathon Jeffries DO          
  04/14/21     0742
D: 04/13/21 1236                           _____________________________________
T: 04/13/21 2031                           Johnathon Jeffries DO            /nt

## 2021-11-09 ENCOUNTER — HOSPITAL ENCOUNTER (OUTPATIENT)
Dept: HOSPITAL 35 - PAIN | Age: 81
Discharge: HOME | End: 2021-11-09
Attending: ANESTHESIOLOGY
Payer: COMMERCIAL

## 2021-11-09 VITALS — SYSTOLIC BLOOD PRESSURE: 146 MMHG | DIASTOLIC BLOOD PRESSURE: 85 MMHG

## 2021-11-09 VITALS — HEIGHT: 65.98 IN | BODY MASS INDEX: 45.32 KG/M2 | WEIGHT: 282 LBS

## 2021-11-09 DIAGNOSIS — M19.90: ICD-10-CM

## 2021-11-09 DIAGNOSIS — Z79.899: ICD-10-CM

## 2021-11-09 DIAGNOSIS — I10: ICD-10-CM

## 2021-11-09 DIAGNOSIS — Z79.891: ICD-10-CM

## 2021-11-09 DIAGNOSIS — M47.27: Primary | ICD-10-CM

## 2021-11-09 DIAGNOSIS — G89.29: ICD-10-CM

## 2021-11-09 DIAGNOSIS — Z98.890: ICD-10-CM

## 2021-11-09 DIAGNOSIS — M47.26: ICD-10-CM

## 2021-11-10 NOTE — HPC
[address]   [address]                     PAIN MANAGEMENT CONSULTATION  
_______________________________________________________________________________
 
Name:       CHRISTIANA KEYS                 Room #:                     REG [facility].#:      [id number]                       Account #:      [id number]  
Admission:  11/09/21    Attend Phys:    Johnathon Jeffries DO  
Discharge:              Date of Birth:  [birth year]  
                                                          Report #: [id number]
                                                                    [id number] 
_______________________________________________________________________________
THIS REPORT FOR:  
 
cc:  Rell Olson MD,Johnathon Jaramillo MD, DO                                          ~
 
cc:     Rell Olson M.D.
DATE OF SERVICE: 11/09/2021
 
REFERRING PHYSICIAN:  Dr. Rell Olson.
 
CHIEF COMPLAINT:  Low back pain, bilateral buttock and posterolateral thigh 
pain.
 
HISTORY OF PRESENT ILLNESS:  As you know, the patient is a class 3 morbidly 
obese 80-year-old female with longstanding history of chronic neck pain due to 
cervical radiculopathy and chronic low back pain due to lumbar radiculopathy.  
She returns today in followup visit, describing pain that has debilitated her 
over the past couple of weeks.  She is placing a pain score of 10/10 involving 
the low back, bilateral lower extremities.  She has returned requesting a lumbar
epidural injection under fluoroscopic guidance.  The patient denies any changes 
in her daily activity that may have led to symptoms.  There has been no new 
injury or trauma.  She has undergone injections in the past for which she states
improvement in symptoms lasting for months.  The most recent injection was 
provided to the patient in April where she underwent a lumbar epidural injection
with benefits reaching until just recently where she has had recurrence of 
symptoms.  She returns today for a lumbar epidural injection under fluoroscopic 
guidance.
 
ALLERGIES:  No known drug allergies.
 
CURRENT MEDICATIONS:  See extensive list in chart.
 
SOCIAL HISTORY:  The patient denies tobacco, alcohol, IV or illicit drug use.  
She retired in 1986.  She is accompanied by a family friend present in room 
today.
 
IMAGING:  No new imaging available.
 
PQRS:  The patient has known arthritic changes of the cervical spine, lumbar 
spine, bilateral hips, bilateral knees and ankles.  No rheumatoid arthritis.  
She is placing current pain score of 10/10.  She is a fall risk, but has not had
a fall in the last 3 months.  She is not on blood thinners, but is treated for 
hypertension.  She is on chronic opioids and based on our assessment tool, has a
low opioid addiction potential.  Pain impact is 22/70, mild to moderate 
interference of daily activities secondary to pain.
 
 
 
[address]   [address]                     PAIN MANAGEMENT CONSULTATION  
_______________________________________________________________________________
 
Name:       CHRISTIANA KEYS                 Room #:                     REG [facility]#:      [id number]                       Account #:      [id number]  
Admission:  11/09/21    Attend Phys:    Johnathon Jeffries DO  
Discharge:              Date of Birth:  [birth year]  
                                                          Report #: [id number]
                                                                    [id number] 
_______________________________________________________________________________
 
PHYSICAL EXAMINATION:
VITAL SIGNS:  Blood pressure 146/85, pulse 94, respiratory rate 18 and 
unlabored.  The patient is 96% on room air.  Height 5 feet 6 inches tall, weight
282.0 pounds, BMI calculated 45.5.
GENERAL:  Well-developed, well-nourished, well-hydrated, severely morbidly obese
80-year-old female appearing her stated age.  Pain is rated today 10/10.
HEENT:  Normocephalic, atraumatic.  Pupils are round.  She is wearing a mask in 
compliance with COVID-19 regulations and hospital policy.
EXTREMITIES:  Show no clubbing, no cyanosis, 1+ nonpitting lower extremity edema
noted again today.
MUSCULOSKELETAL:  Palpatory tenderness is noted over the paraspinal musculature 
of lower lumbar spine.  Seated straight leg raising is negative.  Supine 
straight leg raising is unable to be performed due to body habitus.  Lumbar 
provocation testing is met with increasing pain with all maneuvers.  Gait is 
extremely antalgic.
 
ASSESSMENT:
1.  Chronic lumbar radiculopathy.
2.  Lumbosacral spondylosis with radiculopathy.
3.  Facet arthropathy of lumbar spine.
4.  Class 3 morbid obesity.
5.  Chronic intractable pain.
 
PLAN:
1.  The patient returns today in followup visit requesting to undergo lumbar 
epidural injection under fluoroscopic guidance.  She has done very well with 
previous epidural injection noticing an improvement in symptoms for almost 7 
months.  Unfortunately, her symptoms have recurred.  She returns today in 
followup visit requesting a lumbar epidural injection to be performed.  I 
advised the patient of the risks and benefits.  She states she understood and 
wished to proceed.
2.  No medication changes made at today's visit.  The patient will continue 
current medical therapy as prior prescribed.
3.  We will see the patient back in followup visit for the next in the series of
epidural injections on an as needed basis.  I am hopeful the patient will see 
good and prolonged benefit with today's epidural injection.
 
PROCEDURE NOTE
 
DESCRIPTION OF PROCEDURE:  L5-S1 interlaminar epidural steroid injection under 
fluoroscopic guidance.
 
After obtaining written consent, the patient was taken back to fluoroscopy 
suite, placed in prone position with pillow under abdomen to decrease lumbar 
lordosis.  Skin overlying lumbosacral area then prepped and draped in aseptic 
 
 
 
[address]   [address]                     PAIN MANAGEMENT CONSULTATION  
_______________________________________________________________________________
 
Name:       CHRISTIANA KEYS                 Room #:                     [facility]#:      [id number]                       Account #:      [id number]  
Admission:  11/09/21    Attend Phys:    Johnathon Jeffries DO  
Discharge:              Date of Birth:  [birth year]  
                                                          Report #: [id number]
                                                                    [id number] 
_______________________________________________________________________________
fashion.  The L5-S1 vertebral interspace identified by AP fluoroscopy.  Skin and
subcutaneous tissue overlying target site injection anesthetized with 3 mL 1% 
lidocaine.
 
A 20-gauge 4-1/2 inch Tuohy needle advanced under fluoroscopic guidance towards 
the epidural space using a paramedian approach.  The epidural space identified 
using loss of resistance to air technique.  After negative aspiration for heme 
or cerebrospinal fluid, 1 mL of Omnipaque injected.  Lumbar epidurogram 
confirmed using both AP and lateral fluoroscopy.  After negative aspiration for 
heme or cerebrospinal fluid, 5 mL solution containing 2 mL 40 mg per mL 80 mg 
total triamcinolone along with 3 mL of lidocaine 1% injected slowly.  Needle 
retracted [facility], flushed with 1 mL of 1% lidocaine, then removed.  Sterile 
bandage placed over injection site.  There were no new motor deficits present in
the lower extremities following procedure.
 
The patient tolerated the procedure well, carefully escorted to recovery room in
stable condition.  No apparent complications.  After meeting discharge criteria,
the patient discharged home.
 
 
 
 
 
 
 
 
 
 
 
 
 
 
 
 
 
 
 
 
 
 
 
 
 
 
  <ELECTRONICALLY SIGNED>
   By: Johnathon Jeffries DO          
  11/10/21     0839
D: 11/09/21 1508                           _____________________________________
T: 11/09/21 2207                           Johnathon Jeffries DO            /nt